# Patient Record
Sex: FEMALE | Race: WHITE | NOT HISPANIC OR LATINO | Employment: FULL TIME | URBAN - METROPOLITAN AREA
[De-identification: names, ages, dates, MRNs, and addresses within clinical notes are randomized per-mention and may not be internally consistent; named-entity substitution may affect disease eponyms.]

---

## 2017-02-27 ENCOUNTER — TELEPHONE (OUTPATIENT)
Dept: FAMILY MEDICINE | Facility: CLINIC | Age: 32
End: 2017-02-27

## 2017-02-27 ENCOUNTER — OFFICE VISIT (OUTPATIENT)
Dept: FAMILY MEDICINE | Facility: CLINIC | Age: 32
End: 2017-02-27
Payer: COMMERCIAL

## 2017-02-27 VITALS
BODY MASS INDEX: 29.25 KG/M2 | WEIGHT: 158.94 LBS | SYSTOLIC BLOOD PRESSURE: 116 MMHG | OXYGEN SATURATION: 97 % | HEIGHT: 62 IN | HEART RATE: 98 BPM | DIASTOLIC BLOOD PRESSURE: 76 MMHG

## 2017-02-27 DIAGNOSIS — J32.9 SINUSITIS, UNSPECIFIED CHRONICITY, UNSPECIFIED LOCATION: Primary | ICD-10-CM

## 2017-02-27 LAB
FLUAV AG SPEC QL IA: NEGATIVE
FLUBV AG SPEC QL IA: NEGATIVE
SPECIMEN SOURCE: NORMAL

## 2017-02-27 PROCEDURE — 87400 INFLUENZA A/B EACH AG IA: CPT | Mod: PO

## 2017-02-27 PROCEDURE — 99999 PR PBB SHADOW E&M-EST. PATIENT-LVL III: CPT | Mod: PBBFAC,,, | Performed by: NURSE PRACTITIONER

## 2017-02-27 PROCEDURE — 99213 OFFICE O/P EST LOW 20 MIN: CPT | Mod: S$GLB,,, | Performed by: NURSE PRACTITIONER

## 2017-02-27 RX ORDER — FLUTICASONE PROPIONATE 50 MCG
1 SPRAY, SUSPENSION (ML) NASAL DAILY
Qty: 16 G | Refills: 0 | Status: SHIPPED | OUTPATIENT
Start: 2017-02-27 | End: 2019-02-04 | Stop reason: SDUPTHER

## 2017-02-27 RX ORDER — PROMETHAZINE HYDROCHLORIDE AND DEXTROMETHORPHAN HYDROBROMIDE 6.25; 15 MG/5ML; MG/5ML
5 SYRUP ORAL 3 TIMES DAILY PRN
Qty: 240 ML | Refills: 0 | Status: SHIPPED | OUTPATIENT
Start: 2017-02-27 | End: 2017-03-09

## 2017-02-27 RX ORDER — AMOXICILLIN 875 MG/1
875 TABLET, FILM COATED ORAL 2 TIMES DAILY
Qty: 20 TABLET | Refills: 0 | Status: SHIPPED | OUTPATIENT
Start: 2017-02-27 | End: 2017-03-09

## 2017-02-27 RX ORDER — AZELASTINE 1 MG/ML
1 SPRAY, METERED NASAL 2 TIMES DAILY
Qty: 30 ML | Refills: 0 | Status: SHIPPED | OUTPATIENT
Start: 2017-02-27 | End: 2024-01-26

## 2017-02-27 NOTE — MR AVS SNAPSHOT
[facility]  [address]  [facility] [address]  Phone: [phone]  Fax: [phone]                  Paige Crespo   2017 3:00 PM   Office Visit    Description:  Female : 1985   Provider:  Thi Boss NP   Department:  [facility]           Reason for Visit     Sinus Problem     Cough           Diagnoses this Visit        Comments    Sinusitis, unspecified chronicity, unspecified location    -  Primary            To Do List           Goals (5 Years of Data)     None       These Medications        Disp Refills Start End    fluticasone (FLONASE) 50 mcg/actuation nasal spray 16 g 0 2017     1 spray by Each Nare route once daily. - Each Nare    Pharmacy: [facility]/pharmacy #[id number] - [place] - [address] Ph #: [phone]       azelastine (ASTELIN) 137 mcg (0.1 %) nasal spray 30 mL 0 2017    1 spray (137 mcg total) by Nasal route 2 (two) times daily. - Nasal    Pharmacy: [facility]/pharmacy #[id number] - [place] - [address] Ph #: [phone]       promethazine-dextromethorphan (PROMETHAZINE-DM) 6.25-15 mg/5 mL Syrp 240 mL 0 2017 3/9/2017    Take 5 mLs by mouth 3 (three) times daily as needed. - Oral    Pharmacy: [facility]/pharmacy #[id number] [place] - [address] Ph #: [phone]       amoxicillin (AMOXIL) 875 MG tablet 20 tablet 0 2017 3/9/2017    Take 1 tablet (875 mg total) by mouth 2 (two) times daily. - Oral    Pharmacy: [facility]/pharmacy #[id number] - [place] - [address] Ph #: [phone]         [facility]     [facility] -  Assistance  Registered nurses in the [facility] provide clinical advisement, health education, appointment booking, and other advisory services.  Call for this free service at [phone].             Medications           Message regarding Medications     Verify the changes and/or additions to your medication regime listed below are the same  as discussed with your clinician today.  If any of these changes or additions are incorrect, please notify your healthcare provider.        START taking these NEW medications        Refills    fluticasone (FLONASE) 50 mcg/actuation nasal spray 0    Si spray by Each Nare route once daily.    Class: Normal    Route: Each Nare    azelastine (ASTELIN) 137 mcg (0.1 %) nasal spray 0    Si spray (137 mcg total) by Nasal route 2 (two) times daily.    Class: Normal    Route: Nasal    promethazine-dextromethorphan (PROMETHAZINE-DM) 6.25-15 mg/5 mL Syrp 0    Sig: Take 5 mLs by mouth 3 (three) times daily as needed.    Class: Normal    Route: Oral    amoxicillin (AMOXIL) 875 MG tablet 0    Sig: Take 1 tablet (875 mg total) by mouth 2 (two) times daily.    Class: Normal    Route: Oral      STOP taking these medications     levocetirizine (XYZAL) 5 MG tablet Take 1 tablet (5 mg total) by mouth every evening.           Verify that the below list of medications is an accurate representation of the medications you are currently taking.  If none reported, the list may be blank. If incorrect, please contact your healthcare provider. Carry this list with you in case of emergency.           Current Medications     ibuprofen (ADVIL,MOTRIN) 800 MG tablet Take 800 mg by mouth 2 (two) times daily as needed.    levonorgestrel (MIRENA) 20 mcg/24 hr (5 years) IUD 1 each by Intrauterine route once.    naratriptan (AMERGE) 2.5 MG tablet Take one tablet by mouth at onset of headache, may repeat in 4 hours if needed.  Not to exceed 2 tablets in 24 hours    amoxicillin (AMOXIL) 875 MG tablet Take 1 tablet (875 mg total) by mouth 2 (two) times daily.    azelastine (ASTELIN) 137 mcg (0.1 %) nasal spray 1 spray (137 mcg total) by Nasal route 2 (two) times daily.    fluticasone (FLONASE) 50 mcg/actuation nasal spray 1 spray by Each Nare route once daily.    magnesium oxide (MAG-OX) 400 mg tablet Take 1 tablet (400 mg total) by mouth once daily.     promethazine-dextromethorphan (PROMETHAZINE-DM) 6.25-15 mg/5 mL Syrp Take 5 mLs by mouth 3 (three) times daily as needed.           Clinical Reference Information           Your Vitals Were     BP                   116/76           Blood Pressure          Most Recent Value    BP  116/76      Allergies as of 2/27/2017     No Known Allergies      Immunizations Administered on Date of Encounter - 2/27/2017     None      Orders Placed During Today's Visit      Normal Orders This Visit    Influenza antigen Nasal Swab       Language Assistance Services     ATTENTION: Language assistance services are available, free of charge. Please call [phone].      ATENCIÓN: Si habla espdomenica, tiene a kaufman disposición servicios gratuitos de asistencia lingüística. Llame al [phone].     CHÚ Ý: N?u b?n nói Ti?ng Vi?t, có các d?ch v? h? tr? ngôn ng? mi?n phí dành cho b?n. G?i s? [phone].         [facility] complies with applicable Federal civil rights laws and does not discriminate on the basis of race, color, national origin, age, disability, or sex.

## 2017-02-27 NOTE — TELEPHONE ENCOUNTER
Called in rx for Tessalon 200 mg 1 q8h prn, dispense 30, no refills. Understanding was verbalized.

## 2017-02-27 NOTE — PROGRESS NOTES
Subjective:       Patient ID: Paige rCespo is a 31 y.o. female.    Chief Complaint: Sinus Problem and Cough    HPI Comments: TETANUS VACCINE due on 05/12/2002    Past Medical History:  Past Medical History:  No date: Migraine aura without headache  No date: Migraine headache  No date: TMJ arthritis  Past Surgical History:  2008: BREAST SURGERY      Comment: augmentation  Review of patient's allergies indicates:  No Known Allergies  Current Outpatient Prescriptions on File Prior to Visit:  ibuprofen (ADVIL,MOTRIN) 800 MG tablet, Take 800 mg by mouth 2 (two) times daily as needed., Disp: , Rfl:   naratriptan (AMERGE) 2.5 MG tablet, Take one tablet by mouth at onset of headache, may repeat in 4 hours if needed.  Not to exceed 2 tablets in 24 hours, Disp: 9 tablet, Rfl: 11  magnesium oxide (MAG-OX) 400 mg tablet, Take 1 tablet (400 mg total) by mouth once daily., Disp: , Rfl: 0  (DISCONTINUED) levocetirizine (XYZAL) 5 MG tablet, Take 1 tablet (5 mg total) by mouth every evening., Disp: 30 tablet, Rfl: 3    No current facility-administered medications on file prior to visit.     Social History    Marital status: Single              Spouse name:                       Years of education:                 Number of children:               Occupational History    None on file    Social History Main Topics    Smoking status: Former Smoker                                                                Packs/day: 0.00      Years: 0.00           Quit date: 8/1/2011       Smokeless status: Not on file                       Comment: smoked ~18yo to 28 yo    Alcohol use: No              Drug use: No              Sexual activity: Yes                     Birth control/protection: IUD       Comment: Mirena    Other Topics            Concern    None on file    Social History Narrative    , previously worked for [employer], left in 2013 and 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       Review of patient's family history indicates:    Hypertension                   Father                    Cancer                         Maternal Grandfather        Comment: bone ca    Hypertension                   Paternal Grandmother      Heart disease                  Paternal Grandfather        Comment: MI          Sinus Problem   This is a new (x 1 week) problem. The problem has been gradually worsening (worsneing over the weekend, fever started yesterday) since onset. The maximum temperature recorded prior to her arrival was 101 - 101.9 F. The fever has been present for less than 1 day. Associated symptoms include congestion, coughing, headaches, a hoarse voice, sinus pressure, sneezing and a sore throat. Pertinent negatives include no chills, diaphoresis, ear pain, neck pain, shortness of breath or swollen glands. Past treatments include oral decongestants and acetaminophen. The treatment provided no relief.     Review of Systems   Constitutional: Positive for fatigue and fever. Negative for chills and diaphoresis.   HENT: Positive for congestion, hoarse voice, postnasal drip, rhinorrhea, sinus pressure, sneezing and sore throat. Negative for ear pain.    Respiratory: Positive for cough. Negative for chest tightness and shortness of breath.    Cardiovascular: Negative.    Gastrointestinal: Negative.  Negative for diarrhea, nausea and vomiting.   Musculoskeletal: Negative for neck pain.   Neurological: Positive for headaches. Negative for dizziness and light-headedness.       Objective:      Physical Exam   Constitutional: She is oriented to person, place, and time. No distress.   HENT:   Head: Normocephalic and atraumatic. Head is without raccoon's eyes.   Right Ear: No middle ear effusion.   Left Ear:  No middle ear effusion.   Nose: Mucosal edema and rhinorrhea present. Right sinus exhibits maxillary sinus tenderness and frontal sinus tenderness. Left sinus exhibits maxillary sinus  tenderness and frontal sinus tenderness.   Mouth/Throat: Uvula is midline. Posterior oropharyngeal erythema present.   Eyes: Pupils are equal, round, and reactive to light.   Cardiovascular: Normal rate and regular rhythm.  Exam reveals no friction rub.    No murmur heard.  Pulmonary/Chest: Effort normal and breath sounds normal.   Abdominal: Soft. Bowel sounds are normal. She exhibits no distension. There is no tenderness.   Neurological: She is alert and oriented to person, place, and time.   Skin: She is not diaphoretic. No erythema.   Psychiatric: She has a normal mood and affect. Her behavior is normal.   Vitals reviewed.      Assessment:       1. Sinusitis, unspecified chronicity, unspecified location        Plan:       1. Sinusitis, unspecified chronicity, unspecified location  Follow up if not resolving.  - Influenza antigen Nasal Swab  - fluticasone (FLONASE) 50 mcg/actuation nasal spray; 1 spray by Each Nare route once daily.  Dispense: 16 g; Refill: 0  - azelastine (ASTELIN) 137 mcg (0.1 %) nasal spray; 1 spray (137 mcg total) by Nasal route 2 (two) times daily.  Dispense: 30 mL; Refill: 0  - promethazine-dextromethorphan (PROMETHAZINE-DM) 6.25-15 mg/5 mL Syrp; Take 5 mLs by mouth 3 (three) times daily as needed.  Dispense: 240 mL; Refill: 0  - amoxicillin (AMOXIL) 875 MG tablet; Take 1 tablet (875 mg total) by mouth 2 (two) times daily.  Dispense: 20 tablet; Refill: 0

## 2017-02-27 NOTE — TELEPHONE ENCOUNTER
----- Message from Ivonne Abdul sent at 2/27/2017  3:49 PM CST -----  Call roc at [facility] at [phone]//calling about he medication Promethazine dm its no longer in stock it been back order want to know do you want to get something different//thks ht

## 2017-09-14 ENCOUNTER — OFFICE VISIT (OUTPATIENT)
Dept: INTERNAL MEDICINE | Facility: CLINIC | Age: 32
End: 2017-09-14
Payer: COMMERCIAL

## 2017-09-14 VITALS
HEIGHT: 62 IN | DIASTOLIC BLOOD PRESSURE: 57 MMHG | WEIGHT: 154.13 LBS | TEMPERATURE: 98 F | HEART RATE: 67 BPM | BODY MASS INDEX: 28.36 KG/M2 | SYSTOLIC BLOOD PRESSURE: 99 MMHG

## 2017-09-14 DIAGNOSIS — J30.9 ALLERGIC SINUSITIS: Primary | ICD-10-CM

## 2017-09-14 PROCEDURE — 3008F BODY MASS INDEX DOCD: CPT | Mod: S$GLB,,, | Performed by: INTERNAL MEDICINE

## 2017-09-14 PROCEDURE — 99212 OFFICE O/P EST SF 10 MIN: CPT | Mod: S$GLB,,, | Performed by: INTERNAL MEDICINE

## 2017-09-14 PROCEDURE — 99999 PR PBB SHADOW E&M-EST. PATIENT-LVL III: CPT | Mod: PBBFAC,,, | Performed by: INTERNAL MEDICINE

## 2017-09-14 NOTE — PROGRESS NOTES
"Clinic Note  9/14/2017      Subjective:       Patient ID:  Paige is a 32 y.o. female being seen for an urgent care visit.    Chief Complaint: Cough and Nasal Congestion    Sinus Problem   This is a recurrent problem. Episode onset: 5-6 weeks. The problem is unchanged. There has been no fever. She is experiencing no pain. Associated symptoms include coughing, ear pain, sinus pressure and sneezing. Past treatments include nothing.       Review of Systems   HENT: Positive for ear pain, sinus pressure and sneezing.    Respiratory: Positive for cough.        Medication List with Changes/Refills   Current Medications    AZELASTINE (ASTELIN) 137 MCG (0.1 %) NASAL SPRAY    1 spray (137 mcg total) by Nasal route 2 (two) times daily.    FLUTICASONE (FLONASE) 50 MCG/ACTUATION NASAL SPRAY    1 spray by Each Nare route once daily.    IBUPROFEN (ADVIL,MOTRIN) 800 MG TABLET    Take 800 mg by mouth 2 (two) times daily as needed.    LEVONORGESTREL (MIRENA) 20 MCG/24 HR (5 YEARS) IUD    1 each by Intrauterine route once.    MAGNESIUM OXIDE (MAG-OX) 400 MG TABLET    Take 1 tablet (400 mg total) by mouth once daily.    NARATRIPTAN (AMERGE) 2.5 MG TABLET    Take one tablet by mouth at onset of headache, may repeat in 4 hours if needed.  Not to exceed 2 tablets in 24 hours       Patient Active Problem List   Diagnosis    Migraine headache           Objective:      BP (!) 99/57   Pulse 67   Temp 97.9 °F (36.6 °C)   Ht 5' 2" (1.575 m)   Wt 69.9 kg (154 lb 1.6 oz)   BMI 28.19 kg/m²   Estimated body mass index is 28.19 kg/m² as calculated from the following:    Height as of this encounter: 5' 2" (1.575 m).    Weight as of this encounter: 69.9 kg (154 lb 1.6 oz).  Physical Exam   Constitutional: She is well-developed, well-nourished, and in no distress.   HENT:   Left Ear: Tympanic membrane normal.   Ears:    Nose: Mucosal edema and rhinorrhea present. Right sinus exhibits no maxillary sinus tenderness and no frontal sinus tenderness. " Left sinus exhibits no maxillary sinus tenderness and no frontal sinus tenderness.   Mouth/Throat: No oropharyngeal exudate, posterior oropharyngeal edema, posterior oropharyngeal erythema or tonsillar abscesses.   Cardiovascular: Normal rate.    Pulmonary/Chest: Effort normal.         Assessment and Plan:         Problem List Items Addressed This Visit     None      Visit Diagnoses     Allergic sinusitis    -  Primary - flonase and otc h1 blocker (allegra/claritin/zyrtec etc) recommended.  Does not need abx at this time.           Follow Up:   Return if symptoms worsen or fail to improve.        Jung Baca

## 2017-09-22 ENCOUNTER — OFFICE VISIT (OUTPATIENT)
Dept: URGENT CARE | Facility: CLINIC | Age: 32
End: 2017-09-22
Payer: COMMERCIAL

## 2017-09-22 VITALS
SYSTOLIC BLOOD PRESSURE: 123 MMHG | BODY MASS INDEX: 27.6 KG/M2 | HEART RATE: 93 BPM | WEIGHT: 150 LBS | TEMPERATURE: 98 F | DIASTOLIC BLOOD PRESSURE: 67 MMHG | RESPIRATION RATE: 18 BRPM | HEIGHT: 62 IN

## 2017-09-22 DIAGNOSIS — J32.9 SINUSITIS, UNSPECIFIED CHRONICITY, UNSPECIFIED LOCATION: Primary | ICD-10-CM

## 2017-09-22 DIAGNOSIS — J30.9 ACUTE ALLERGIC RHINITIS, UNSPECIFIED SEASONALITY, UNSPECIFIED TRIGGER: ICD-10-CM

## 2017-09-22 DIAGNOSIS — H61.21 IMPACTED CERUMEN OF RIGHT EAR: ICD-10-CM

## 2017-09-22 PROCEDURE — 69209 REMOVE IMPACTED EAR WAX UNI: CPT | Mod: RT,S$GLB,, | Performed by: PHYSICIAN ASSISTANT

## 2017-09-22 PROCEDURE — 96372 THER/PROPH/DIAG INJ SC/IM: CPT | Mod: S$GLB,,, | Performed by: PHYSICIAN ASSISTANT

## 2017-09-22 PROCEDURE — 3008F BODY MASS INDEX DOCD: CPT | Mod: S$GLB,,, | Performed by: PHYSICIAN ASSISTANT

## 2017-09-22 PROCEDURE — 99203 OFFICE O/P NEW LOW 30 MIN: CPT | Mod: 25,S$GLB,, | Performed by: PHYSICIAN ASSISTANT

## 2017-09-22 RX ORDER — AMOXICILLIN AND CLAVULANATE POTASSIUM 875; 125 MG/1; MG/1
1 TABLET, FILM COATED ORAL 2 TIMES DAILY
Qty: 20 TABLET | Refills: 0 | Status: SHIPPED | OUTPATIENT
Start: 2017-09-22 | End: 2017-10-02

## 2017-09-22 RX ORDER — BETAMETHASONE SODIUM PHOSPHATE AND BETAMETHASONE ACETATE 3; 3 MG/ML; MG/ML
9 INJECTION, SUSPENSION INTRA-ARTICULAR; INTRALESIONAL; INTRAMUSCULAR; SOFT TISSUE ONCE
Status: COMPLETED | OUTPATIENT
Start: 2017-09-22 | End: 2017-09-22

## 2017-09-22 RX ADMIN — BETAMETHASONE SODIUM PHOSPHATE AND BETAMETHASONE ACETATE 9 MG: 3; 3 INJECTION, SUSPENSION INTRA-ARTICULAR; INTRALESIONAL; INTRAMUSCULAR; SOFT TISSUE at 10:09

## 2017-09-22 NOTE — PATIENT INSTRUCTIONS
- Please return here or go to the Emergency Department for any concerns or worsening of condition.   - If you were prescribed antibiotics, please take them to completion.  - Please follow up with your primary care provider (PCP) or discussed specialist(s) as needed.         Sinusitis (Antibiotic Treatment)    The sinuses are air-filled spaces within the bones of the face. They connect to the inside of the nose. Sinusitis is an inflammation of the tissue lining the sinus cavity. Sinus inflammation can occur during a cold. It can also be due to allergies to pollens and other particles in the air. Sinusitis can cause symptoms of sinus congestion and fullness. A sinus infection causes fever, headache and facial pain. There is often green or yellow drainage from the nose or into the back of the throat (post-nasal drip). You have been given antibiotics to treat this condition.  Home care:  · Take the full course of antibiotics as instructed. Do not stop taking them, even if you feel better.  · Drink plenty of water, hot tea, and other liquids. This may help thin mucus. It also may promote sinus drainage.  · Heat may help soothe painful areas of the face. Use a towel soaked in hot water. Or,  the shower and direct the hot spray onto your face. Using a vaporizer along with a menthol rub at night may also help.   · An expectorant containing guaifenesin may help thin the mucus and promote drainage from the sinuses.  · Over-the-counter decongestants may be used unless a similar medicine was prescribed. Nasal sprays work the fastest. Use one that contains phenylephrine or oxymetazoline. First blow the nose gently. Then use the spray. Do not use these medicines more often than directed on the label or symptoms may get worse. You may also use tablets containing pseudoephedrine. Avoid products that combine ingredients, because side effects may be increased. Read labels. You can also ask the pharmacist for help.  (NOTE: Persons with high blood pressure should not use decongestants. They can raise blood pressure.)  · Over-the-counter antihistamines may help if allergies contributed to your sinusitis.    · Do not use nasal rinses or irrigation during an acute sinus infection, unless told to by your health care provider. Rinsing may spread the infection to other sinuses.  · Use acetaminophen or ibuprofen to control pain, unless another pain medicine was prescribed. (If you have chronic liver or kidney disease or ever had a stomach ulcer, talk with your doctor before using these medicines. Aspirin should never be used in anyone under 18 years of age who is ill with a fever. It may cause severe liver damage.)  · Don't smoke. This can worsen symptoms.  Follow-up care  Follow up with your healthcare provider or our staff if you are not improving within the next week.  When to seek medical advice  Call your healthcare provider if any of these occur:  · Facial pain or headache becoming more severe  · Stiff neck  · Unusual drowsiness or confusion  · Swelling of the forehead or eyelids  · Vision problems, including blurred or double vision  · Fever of 100.4ºF (38ºC) or higher, or as directed by your healthcare provider  · Seizure  · Breathing problems  · Symptoms not resolving within 10 days  Date Last Reviewed: 4/13/2015  © 7361-9778 [employer]. [address] [place] [address], [place] [address]. All rights reserved. This information is not intended as a substitute for professional medical care. Always follow your healthcare professional's instructions.      Earwax Removal    The ear canal makes earwax from the canals lining. The ears make wax to lubricate and protect the ear canal. The ear canal is the tube that connects the middle ear to the outside of the ear. The wax protects the ear from bacteria, infection, and damage from water or trauma.  The wax that forms in the canal naturally moves toward the outside of the ear and  falls out. In some cases, the ear may make too much wax. If the wax causes problems or keeps the healthcare provider from seeing into the ear, the extra wax may be removed.  Too much wax can affect your hearing. It can cause itching. In rare cases, it can be painful. Earwax should not be removed unless it is causing a problem. You should not stick objects into your ear to remove wax unless told to do so by your healthcare provider.  Healthcare providers can remove earwax safely. It is important to stay still during the procedure to avoid damage to the ear canal. But removing earwax generally doesnt hurt. You will not usually need anesthesia or pain medicine when the provider removes the earwax.  A number of conditions lead to earwax buildup. These include some skin problems, a narrow ear canal, or ears that make too much earwax. Using cotton swabs in the canal pushes earwax deeper into the ear and contributes to the buildup of earwax.  Home care  · The healthcare provider may recommend mineral oil or an over-the-counter eardrop to use at home to soften the earwax. Use these products only if the provider recommends them. Use these products only if the provider recommends them. Carefully follow the instructions given.  · Dont use mineral oil or OTC eardrops if you might have an ear infection or a ruptured eardrum. Tell your healthcare provider right away if you have diabetes or an immune disorder.  · Dont use cotton swabs in your ears. Cotton swabs may push wax deeper into the ear canal or damage the eardrum. Use cotton gauze or a wet washcloth  to gently remove wax on the outside of the ear and around the opening to the ear canal.  · Don't use any probing device or object such as cotton-tipped swabs or lakeisha pins to clean the inside of your ears.  · Dont use ear candles to clean your ears. Candling can be dangerous. It can burn the ear canal. It can also make the condition worse instead of better.  · Dont use  cold water to rinse the ear. This will make you dizzy. If your provider tells you to rinse your ear, use only warm water or follow his or her instructions.  · Check the ear for signs of infection or irritation listed below under When to seek medical advice.  Steps for using eardrops  1. Warm the medicine bottle by rubbing it between your hands for a few minutes.  2. Lie down on your side, with the affected ear up.  3. Place the recommended number of drops in the ear. Wet a cotton ball with the medicine. Gently put the cotton ball into the ear opening.  Follow-up care  Follow up with your healthcare provider, or as directed.  When to seek medical advice  Call the provider right away if you have:  · Ear pain that gets worse  · Fever of 100.4F°F (38°C) or higher, or as directed by your healthcare provider  · Worsening wax buildup  · Severe pain, dizziness, or nausea  · Bleeding from the ear  · Hearing problems  · Signs of irritation from the eardrops, such as burning, stinging, or swelling and tenderness  · Foul-smelling fluid draining from the ear  · Swelling, redness, or tenderness of the outer ear  · Headache, neck pain, or stiff neck  Date Last Reviewed: 3/22/2015  © 2592-0612 [employer]. [address] [place] [address], [place] [address]. All rights reserved. This information is not intended as a substitute for professional medical care. Always follow your healthcare professional's instructions.      Allergic Rhinitis  Allergic rhinitis is an allergic reaction that affects the nose, and often the eyes. Its often known as nasal allergies. Nasal allergies are often due to things in the environment that are breathed in. Depending what you are sensitive to, nasal allergies may occur only during certain seasons. Or they may occur year round. Common indoor allergens include house dust mites, mold, cockroaches, and pet dander. Outdoor allergens include pollen from trees, grasses, and weeds.   Symptoms include a  drippy, stuffy, and itchy nose. They also include sneezing and red and itchy eyes. You may feel tired more often. Severe allergies may also affect your breathing and trigger a condition called asthma.   Tests can be done to see what allergens are affecting you. You may be referred to an allergy specialist for testing and further evaluation.  Home care  Your healthcare provider may prescribe medicines to help relieve allergy symptoms. These may include oral medicines, nasal sprays, or eye drops.  Ask your provider for advice on how to avoid substances that you are allergic to. Below are a few tips for each type of allergen.  Pet dander:  · Do not have pets with fur and feathers.  · If you can't avoid having a pet, keep it out of your bedroom and off upholstered furniture.  Pollen:  · When pollen counts are high, keep windows of your car and home closed. If possible, use an air conditioner instead.  · Wear a filter mask when mowing or doing yard work.  House dust mites:  · Wash bedding every week in warm water and detergent and dry on a hot setting.  · Cover the mattress, box spring, and pillows with allergy covers.   · If possible, sleep in a room with no carpet, curtains, or upholstered furniture.  Cockroaches:  · Store food in sealed containers.  · Remove garbage from the home promptly.  · Fix water leaks  Mold:  · Keep humidity low by using a dehumidifier or air conditioner. Keep the dehumidifier and air conditioner clean and free of mold.  · Clean moldy areas with bleach and water.  In general:  · Vacuum once or twice a week. If possible, use a vacuum with a high-efficiency particulate air (HEPA) filter.  · Do not smoke. Avoid cigarette smoke. Cigarette smoke is an irritant that can make symptoms worse.  Follow-up care  Follow up as advised by the healthcare provider or our staff. If you were referred to an allergy specialist, make this appointment promptly.  When to seek medical advice  Call your healthcare  provider right away if the following occur:  · Coughing or wheezing  · Fever greater than 100.4°F (38°C)  · Hives (raised red bumps)  · Continuing symptoms, new symptoms, or worsening symptoms  Call 911 right away if you have:  · Trouble breathing  · Severe swelling of the face or severe itching of the eyes or mouth  Date Last Reviewed: 3/1/2017  © 1672-1452 [employer]. [address] [place] [address], [place] [address]. All rights reserved. This information is not intended as a substitute for professional medical care. Always follow your healthcare professional's instructions.

## 2017-09-22 NOTE — PROGRESS NOTES
"Subjective:       Patient ID: Paige Crespo is a 32 y.o. female.    Vitals:  height is 5' 2" (1.575 m) and weight is 68 kg (150 lb). Her oral temperature is 97.6 °F (36.4 °C). Her blood pressure is 123/67 and her pulse is 93. Her respiration is 18.     Chief Complaint: Sinus Problem    This is a 32 y.o. female with Past Medical History:  No date: Migraine aura without headache  No date: Migraine headache  No date: TMJ arthritis   who presents today with a chief complaint of sinus problem.      Sinus Problem   This is a new problem. The current episode started more than 1 month ago (6 weeks). The problem has been gradually worsening since onset. There has been no fever. Her pain is at a severity of 0/10. She is experiencing no pain. Associated symptoms include congestion, coughing, ear pain (right ear), headaches, a hoarse voice, sinus pressure and a sore throat. Pertinent negatives include no chills, neck pain, shortness of breath or swollen glands. Past treatments include acetaminophen, sitting up and saline sprays (flonase and oral antihistamines). The treatment provided no relief.     Review of Systems   Constitution: Positive for malaise/fatigue. Negative for chills and fever.   HENT: Positive for congestion, ear pain (right ear), hoarse voice, sinus pressure and sore throat. Negative for ear discharge.    Eyes: Negative for blurred vision, discharge, pain, redness and visual disturbance.   Cardiovascular: Negative for chest pain, dyspnea on exertion, leg swelling, near-syncope and syncope.   Respiratory: Positive for cough and sputum production. Negative for shortness of breath and wheezing.    Hematologic/Lymphatic: Negative for adenopathy.   Skin: Negative for itching and rash.   Musculoskeletal: Negative for back pain, myalgias, neck pain and stiffness.   Gastrointestinal: Negative for abdominal pain, diarrhea, nausea and vomiting.   Neurological: Positive for headaches. Negative for dizziness, " light-headedness and numbness.   Psychiatric/Behavioral: Negative for altered mental status.   Allergic/Immunologic: Negative for hives.   All other systems reviewed and are negative.      Objective:      Physical Exam   Constitutional: She is oriented to person, place, and time. She appears well-developed and well-nourished.  Non-toxic appearance. She does not have a sickly appearance. She does not appear ill. No distress.   HENT:   Head: Normocephalic and atraumatic.   Right Ear: Hearing and external ear normal. No drainage, swelling or tenderness. A foreign body (cerumen impaction ) is present. Tympanic membrane is bulging. Tympanic membrane is not injected and not erythematous. No hemotympanum. No decreased hearing is noted.   Left Ear: Hearing, external ear and ear canal normal. No drainage, swelling or tenderness. No foreign bodies. Tympanic membrane is bulging. Tympanic membrane is not injected and not erythematous. No hemotympanum. No decreased hearing is noted.   Nose: Mucosal edema present. No epistaxis. Right sinus exhibits maxillary sinus tenderness and frontal sinus tenderness. Left sinus exhibits maxillary sinus tenderness and frontal sinus tenderness.   Mouth/Throat: Uvula is midline and mucous membranes are normal. No uvula swelling. Posterior oropharyngeal erythema present.   Eyes: Pupils are equal, round, and reactive to light.   Neck: Normal range of motion. Neck supple.   Cardiovascular: Normal rate, regular rhythm and normal heart sounds.  Exam reveals no gallop and no friction rub.    No murmur heard.  Pulmonary/Chest: Effort normal. No respiratory distress. She has no decreased breath sounds. She has no wheezes. She has rhonchi (very mild. clears easily with coughing. upper airway congestion noted.) in the right middle field and the left middle field. She has no rales.   Musculoskeletal: Normal range of motion.   Lymphadenopathy:        Head (right side): No submental, no submandibular, no  "tonsillar, no preauricular, no posterior auricular and no occipital adenopathy present.        Head (left side): No submental, no submandibular, no tonsillar, no preauricular, no posterior auricular and no occipital adenopathy present.     She has no cervical adenopathy.        Right cervical: No posterior cervical adenopathy present.       Left cervical: No posterior cervical adenopathy present.        Right: No supraclavicular adenopathy present.        Left: No supraclavicular adenopathy present.   Neurological: She is alert and oriented to person, place, and time. She is not disoriented. Coordination and gait normal.   Skin: No abrasion, no ecchymosis, no laceration and no rash noted. No erythema.   Psychiatric: She has a normal mood and affect. Her behavior is normal.   Nursing note and vitals reviewed.      /67 (BP Location: Left arm, Patient Position: Sitting, BP Method: Medium (Automatic))   Pulse 93   Temp 97.6 °F (36.4 °C) (Oral)   Resp 18   Ht 5' 2" (1.575 m)   Wt 68 kg (150 lb)   PF 98 L/min   BMI 27.44 kg/m²      Auditory canal(s) of the right ear are completely obstructed with cerumen.   Cerumen was removed using gentle irrigation. Tympanic membranes are intact following the procedure.  Auditory canals are normal.     Assessment:       1. Sinusitis, unspecified chronicity, unspecified location    2. Impacted cerumen of right ear    3. Acute allergic rhinitis, unspecified seasonality, unspecified trigger        Plan:         Sinusitis, unspecified chronicity, unspecified location  -     betamethasone acetate-betamethasone sodium phosphate injection 9 mg; Inject 1.5 mLs (9 mg total) into the muscle once.  -     amoxicillin-clavulanate 875-125mg (AUGMENTIN) 875-125 mg per tablet; Take 1 tablet by mouth 2 (two) times daily.  Dispense: 20 tablet; Refill: 0    Impacted cerumen of right ear    Acute allergic rhinitis, unspecified seasonality, unspecified trigger  -     betamethasone " acetate-betamethasone sodium phosphate injection 9 mg; Inject 1.5 mLs (9 mg total) into the muscle once.    - Please return here or go to the Emergency Department for any concerns or worsening of condition.   - If you were prescribed antibiotics, please take them to completion.  - Please follow up with your primary care provider (PCP) or discussed specialist(s) as needed.

## 2018-01-22 ENCOUNTER — PATIENT MESSAGE (OUTPATIENT)
Dept: NEUROLOGY | Facility: CLINIC | Age: 33
End: 2018-01-22

## 2018-01-22 DIAGNOSIS — G43.009 MIGRAINE WITHOUT AURA AND WITHOUT STATUS MIGRAINOSUS, NOT INTRACTABLE: Primary | ICD-10-CM

## 2018-01-22 RX ORDER — KETOROLAC TROMETHAMINE 10 MG/1
10 TABLET, FILM COATED ORAL 3 TIMES DAILY PRN
Qty: 30 TABLET | Refills: 1 | Status: SHIPPED | OUTPATIENT
Start: 2018-01-22 | End: 2018-03-23

## 2018-05-29 ENCOUNTER — TELEPHONE (OUTPATIENT)
Dept: OBSTETRICS AND GYNECOLOGY | Facility: CLINIC | Age: 33
End: 2018-05-29

## 2018-05-29 NOTE — TELEPHONE ENCOUNTER
----- Message from Shola Ordaz sent at 5/29/2018  4:13 PM CDT -----  Contact: Pt  Name of Who is Calling: Paige      What is the request in detail: Pt is calling states she would like to have birth control taken out when she comes to appointment on Thursday.      Can the clinic reply by [facility]: yes      What Number to Call Back if not in [facility]: [phone] or [phone] (home)

## 2018-05-31 ENCOUNTER — OFFICE VISIT (OUTPATIENT)
Dept: OBSTETRICS AND GYNECOLOGY | Facility: CLINIC | Age: 33
End: 2018-05-31
Payer: COMMERCIAL

## 2018-05-31 VITALS
SYSTOLIC BLOOD PRESSURE: 116 MMHG | WEIGHT: 155.44 LBS | DIASTOLIC BLOOD PRESSURE: 64 MMHG | BODY MASS INDEX: 28.43 KG/M2

## 2018-05-31 DIAGNOSIS — Z12.4 ENCOUNTER FOR PAP SMEAR OF CERVIX WITH HPV DNA COTESTING: Primary | ICD-10-CM

## 2018-05-31 DIAGNOSIS — Z30.430 ENCOUNTER FOR INSERTION OF INTRAUTERINE CONTRACEPTIVE DEVICE (IUD): ICD-10-CM

## 2018-05-31 PROCEDURE — 88175 CYTOPATH C/V AUTO FLUID REDO: CPT

## 2018-05-31 PROCEDURE — 99385 PREV VISIT NEW AGE 18-39: CPT | Mod: S$GLB,,, | Performed by: OBSTETRICS & GYNECOLOGY

## 2018-05-31 PROCEDURE — 87624 HPV HI-RISK TYP POOLED RSLT: CPT

## 2018-05-31 PROCEDURE — 99999 PR PBB SHADOW E&M-EST. PATIENT-LVL III: CPT | Mod: PBBFAC,,, | Performed by: OBSTETRICS & GYNECOLOGY

## 2018-05-31 RX ORDER — KETOROLAC TROMETHAMINE 10 MG/1
10 TABLET, FILM COATED ORAL EVERY 6 HOURS
COMMUNITY
End: 2019-01-23 | Stop reason: SDUPTHER

## 2018-05-31 NOTE — PROGRESS NOTES
Chief Complaint   Patient presents with    Well Woman    Contraception     Remove IUD and discuss alternatives       History and Physical:  No LMP recorded. Patient has had an implant.       Paige Crespo is a 33 y.o.  female who presents today for her routine annual GYN exam. The patient has no Gynecology complaints today. Due for IUD out now.       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 16 g 0    levonorgestrel (MIRENA) 20 mcg/24 hr (5 years) IUD 1 each by Intrauterine route once.      azelastine (ASTELIN) 137 mcg (0.1 %) nasal spray 1 spray (137 mcg total) by Nasal route 2 (two) times daily. 30 mL 0    [DISCONTINUED] magnesium oxide (MAG-OX) 400 mg tablet Take 1 tablet (400 mg total) by mouth once daily.  0    [DISCONTINUED] naratriptan (AMERGE) 2.5 MG tablet Take one tablet by mouth at onset of headache, may repeat in 4 hours if needed.  Not to exceed 2 tablets in 24 hours 9 tablet 11     No current facility-administered medications on file prior to visit.        OB History      Para Term  AB Living    2 2       2    SAB TAB Ectopic Multiple Live Births                       Social History     Social History    Marital status: Single     Spouse name: N/A    Number of children: N/A    Years of education: N/A     Occupational History    Not on file.     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        Family History   Problem Relation Age of Onset    Hypertension Father     Cancer Maternal Grandfather         bone ca    Hypertension Paternal Grandmother     Heart disease Paternal Grandfather         MI         Past medical and surgical history reviewed.   I have reviewed the patient's medical history in detail and updated the computerized patient record.        Review of System:  General: no chills, fever, night sweats, weight gain or weight loss  Psychological: no depression or suicidal ideation  Breasts: no new or changing breast lumps, nipple discharge or masses.  Respiratory: no cough, shortness of breath, or wheezing  Cardiovascular: no chest pain or dyspnea on exertion  Gastrointestinal: no abdominal pain, change in bowel habits, or black or bloody stools  Genito-Urinary: no incontinence, urinary frequency/urgency or vulvar/vaginal symptoms, pelvic pain or abnormal vaginal bleeding.  Musculoskeletal: no gait disturbance or muscular weakness      Physical Exam:   /64   Wt 70.5 kg (155 lb 6.8 oz)   BMI 28.43 kg/m²   Constitutional: She is oriented to person, place, and time. She appears well-developed and well-nourished. No distress.   HENT:   Head: Normocephalic and atraumatic.   Eyes: Conjunctivae and EOM are normal. No scleral icterus.   Neck: Normal range of motion. Neck supple. No tracheal deviation present.   Cardiovascular: Normal rate.    Pulmonary/Chest: Effort normal. No respiratory distress. She exhibits no tenderness.  Breasts: are symmetrical. Implants bilaterally.    Right breast exhibits no inverted nipple, no mass, no nipple discharge, no skin change and no tenderness.    Left breast exhibits no inverted nipple, no mass, no nipple discharge, no skin change and no tenderness.  Abdominal: Soft. She exhibits no distension and no mass. There is no tenderness. There is no rebound and no guarding.   Genitourinary:    External rectal exam shows no thrombosed external hemorrhoids.    Pelvic exam was performed with patient supine.   No labial fusion.   There is no rash, lesion or injury on the right labia.   There is no rash, lesion or injury on the left labia.   No bleeding and no signs of injury around the vaginal introitus, urethra is without lesions and well supported. The cervix is visualized with no discharge, lesions or friability.   No vaginal discharge found.    No significant Cystocele, Enterocele or rectocele, and uterus well supported.   Bimanual exam:   The urethra is normal to palpation and there are no palpable vaginal wall masses.   Uterus is not deviated, not enlarged, not fixed, normal shape and not tender.   Cervix exhibits no motion tenderness.    Right adnexum displays no mass and no tenderness.   Left adnexum displays no mass and no tenderness.  Musculoskeletal: Normal range of motion.   Lymphadenopathy: No inguinal adenopathy present.   Neurological: She is alert and oriented to person, place, and time. Coordination normal.   Skin: Skin is warm and dry. She is not diaphoretic.   Psychiatric: She has a normal mood and affect.      Assessment:   Normal annual GYN exam  1. Encounter for Pap smear of cervix with HPV DNA cotesting  Liquid-based pap smear, screening    HPV High Risk Genotypes, PCR       Plan:   PAP  Mammogram at 39  Schedule IUD removal and replacement.   Follow up in 2 weeks for iud placeent.  Patient informed will be contacted with results within 2 weeks. Encouraged to please call back or email if she has not heard from us by then.

## 2018-06-07 LAB
HPV16 AG SPEC QL: NEGATIVE
HPV16+18+H RISK 12 DNA CVX-IMP: NEGATIVE
HPV18 DNA SPEC QL NAA+PROBE: NEGATIVE

## 2018-07-11 ENCOUNTER — OFFICE VISIT (OUTPATIENT)
Dept: OBSTETRICS AND GYNECOLOGY | Facility: CLINIC | Age: 33
End: 2018-07-11
Payer: COMMERCIAL

## 2018-07-11 VITALS
WEIGHT: 153.88 LBS | SYSTOLIC BLOOD PRESSURE: 112 MMHG | DIASTOLIC BLOOD PRESSURE: 72 MMHG | BODY MASS INDEX: 28.15 KG/M2

## 2018-07-11 DIAGNOSIS — Z30.432 ENCOUNTER FOR IUD REMOVAL: ICD-10-CM

## 2018-07-11 DIAGNOSIS — Z30.430 ENCOUNTER FOR INSERTION OF INTRAUTERINE CONTRACEPTIVE DEVICE (IUD): Primary | ICD-10-CM

## 2018-07-11 DIAGNOSIS — Z97.5 IUD CONTRACEPTION: ICD-10-CM

## 2018-07-11 LAB
B-HCG UR QL: NEGATIVE
CTP QC/QA: YES

## 2018-07-11 PROCEDURE — 88300 SURGICAL PATH GROSS: CPT | Mod: 26,,, | Performed by: PATHOLOGY

## 2018-07-11 PROCEDURE — 81025 URINE PREGNANCY TEST: CPT | Mod: S$GLB,,, | Performed by: OBSTETRICS & GYNECOLOGY

## 2018-07-11 PROCEDURE — 58301 REMOVE INTRAUTERINE DEVICE: CPT | Mod: S$GLB,,, | Performed by: OBSTETRICS & GYNECOLOGY

## 2018-07-11 PROCEDURE — 99999 PR PBB SHADOW E&M-EST. PATIENT-LVL III: CPT | Mod: PBBFAC,,, | Performed by: OBSTETRICS & GYNECOLOGY

## 2018-07-11 PROCEDURE — 58300 INSERT INTRAUTERINE DEVICE: CPT | Mod: S$GLB,,, | Performed by: OBSTETRICS & GYNECOLOGY

## 2018-07-11 PROCEDURE — 3008F BODY MASS INDEX DOCD: CPT | Mod: CPTII,S$GLB,, | Performed by: OBSTETRICS & GYNECOLOGY

## 2018-07-11 PROCEDURE — 88300 SURGICAL PATH GROSS: CPT | Performed by: PATHOLOGY

## 2018-07-11 NOTE — PROGRESS NOTES
History of Present Illness:   Pateint presents today 1 month status post Intrauterine Device  Insertion without complaint. .    Pathology: none    Physical exam:  /72   Wt 69.8 kg (153 lb 14.1 oz)   BMI 28.15 kg/m²   Constitutional: She is oriented to person, place, and time. She appears well-developed and well-nourished. No distress.   HENT:   Head: Normocephalic and atraumatic.   Eyes: Conjunctivae and EOM are normal. No scleral icterus.   Neck: Normal range of motion. Neck supple. No tracheal deviation present.   Cardiovascular: Normal rate.    Pulmonary/Chest: Effort normal. No respiratory distress. She exhibits no tenderness.  Breasts: deferred  Abdominal: Soft. She exhibits no distension and no mass.  The incision is healing well. There is no rebound and no guarding.   Genitourinary:     External rectal exam shows no thrombosed external hemorrhoids.    Pelvic exam was performed with patient supine.   There is no rash, lesion or injury on the right labia.   There is no rash, lesion or injury on the left labia.   No bleeding and no signs of injury around the vaginal introitus, urethra is without lesions and well supported. The cervix is visualized with no discharge, lesions or friability. Intrauterine Device string easily visualized. Removed without difficulty.    No vaginal discharge found.    No significant Cystocele, Enterocele or rectocele, and uterus well supported.    Intrauterine device Placement:  7/11/2018  PRE-IUD PLACEMENT COUNSELING:  All contraceptive options were reviewed and the patient chooses an IUD.  The patient's history was reviewed and there are no contraindications to an IUD. The procedure and minimal risks of pain, bleeding, perforation and infection at the insertion and spontaneous expulsion within the first two weeks was discussed. The benefits of amenorrhea and no systemic side effects were explained. All questions were answered and the patient agrees to proceed. Consent was  signed (scanned into computer).    EXAM:  Uterine Position: antiverted    PROCEDURE:  TIME OUT PERFORMED.  The cervix visualized with a speculum.  A single tooth tenaculum was not placed on the anterior lip.  The uterus sounded to 9cm using sterile technique.  A Mirena IUD (lot#[id number])  was loaded and placed high in uterine fundus without difficulty using sterile technique.  The string was cut to 2cm length from exo cervix.   All instruments were removed from the cervix and vagina and the procedure was tolerated well.     ASSESSMENT:  1. Contraception management / IUD insertion.V25.0.    POST IUD PLACEMENT COUNSELING:  Manage post IUD placement pain with NSAIDs, Tylenol or Rx per Med[employer].  IUD danger signs and how to check the strings.  Removal in 5 years for Mirena IUD and in 10 years for Copper IUD.    Counseling lasted approximately 15 minutes and all her questions were answered.    FOLLOW-UP: With me in four weeks.Psychiatric: She has a normal mood and affect.    Assessment:  Doing well with IUD.    Plan:  Resume normal activity and follow up as scheduled for routine screening.

## 2018-08-06 ENCOUNTER — OFFICE VISIT (OUTPATIENT)
Dept: FAMILY MEDICINE | Facility: CLINIC | Age: 33
End: 2018-08-06
Payer: COMMERCIAL

## 2018-08-06 VITALS
HEART RATE: 72 BPM | SYSTOLIC BLOOD PRESSURE: 116 MMHG | WEIGHT: 155.56 LBS | HEIGHT: 62 IN | BODY MASS INDEX: 28.63 KG/M2 | DIASTOLIC BLOOD PRESSURE: 72 MMHG

## 2018-08-06 DIAGNOSIS — G43.009 MIGRAINE WITHOUT AURA AND WITHOUT STATUS MIGRAINOSUS, NOT INTRACTABLE: ICD-10-CM

## 2018-08-06 DIAGNOSIS — Z00.00 PREVENTATIVE HEALTH CARE: Primary | ICD-10-CM

## 2018-08-06 DIAGNOSIS — L74.512 HYPERHIDROSIS OF PALMS AND SOLES: ICD-10-CM

## 2018-08-06 DIAGNOSIS — L74.513 HYPERHIDROSIS OF PALMS AND SOLES: ICD-10-CM

## 2018-08-06 PROCEDURE — 99999 PR PBB SHADOW E&M-EST. PATIENT-LVL III: CPT | Mod: PBBFAC,,, | Performed by: INTERNAL MEDICINE

## 2018-08-06 PROCEDURE — 99395 PREV VISIT EST AGE 18-39: CPT | Mod: S$GLB,,, | Performed by: INTERNAL MEDICINE

## 2018-08-06 NOTE — PROGRESS NOTES
Assessment and Plan:    1. Preventative health care  Discussed age appropriate preventative healthcare items including avoidance of tobacco, excessive alcohol consumption, and illicit drugs. Discussed safe sex practices. Discussed appropriate use of sunscreen, seatbelts, etc. Discussed maintenance of a healthy weight.   Patient thinks she is UTD on Tdap, has records at home and will try to send this to us.   She will get the flu shot through work ([employer] employee) when available.   She is UTD on Pap.   - Comprehensive metabolic panel; Future  - Lipid panel; Future  - CBC auto differential; Future  - TSH; Future    2. Migraine without aura and without status migrainosus, not intractable  Sees Neurology for this, she is happy to continue to see provider on the [facility] as she works nearby.     3. Hyperhidrosis of palms and soles  We discussed treatment options for this. She will be starting to use topical antiperspirant, there is not a stronger prescription version available beyond what she is already using (carpe lotion). Botox was discussed as an option, but this is typically painful on palms and soles and can cause temporary muscle weakness. If she is interested in this in the future, we can try to find a provider who offers this treatment.         ______________________________________________________________________  Subjective:    Chief Complaint:  Establish care    HPI:  Paige is a 33 y.o. year old woman here to establish care.     She sees Dr. Dubois for Gyn care, she is UTD with Pap (Normal with negative HRHPV in May 2018) and has an IUD in place for contraception.     She notes that she has been sweating a lot, notes that she has always had sweaty palms and feet. Lately she has noticed that she has been sweating more. Sometimes this is emotionally related, but not always.     She has seen Neurology in the past for management of migraine with aura. When last seen in 2016 she had been on magnesium oxide for  migraine ppx with triptan for severe migraines.     She has been trying to exercise more, but notes that her diet is not the greatest. She has been gaining weight since she left the  in 2014.     Past Medical History:  Past Medical History:   Diagnosis Date    Migraine aura without headache     Migraine headache     TMJ arthritis        Past Surgical History:  Past Surgical History:   Procedure Laterality Date    BREAST SURGERY  2008    augmentation       Family History:  Family History   Problem Relation Age of Onset    Hypertension Father     Cancer Maternal Grandfather         bone ca    Hypertension Paternal Grandmother     Heart disease Paternal Grandfather         MI    No Known Problems Mother        Social History:  Social History     Social History    Marital status: Single     Spouse name: N/A    Number of children: N/A    Years of education: N/A     Social History Main Topics    Smoking status: Former Smoker     Quit date: 8/1/2011    Smokeless tobacco: Never Used      Comment: smoked ~18yo to 28 yo    Alcohol use Yes      Comment: Occasional, never heavy drinker    Drug use: No    Sexual activity: Yes     Birth control/ protection: IUD      Comment: Mirena     Other Topics Concern    None     Social History Narrative    , previously worked for the [employer], left in 2013 and 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        Medications:  Current Outpatient Prescriptions on File Prior to Visit   Medication Sig Dispense Refill    azelastine (ASTELIN) 137 mcg (0.1 %) nasal spray 1 spray (137 mcg total) by Nasal route 2 (two) times daily. 30 mL 0    fluticasone (FLONASE) 50 mcg/actuation nasal spray 1 spray by Each Nare route once daily. 16 g 0    ketorolac (TORADOL) 10 mg tablet Take 10 mg by mouth every 6 (six) hours.      levonorgestrel (MIRENA) 20 mcg/24 hr (5 years) IUD 1 each by Intrauterine route once.    "    No current facility-administered medications on file prior to visit.        Allergies:  Patient has no known allergies.    Immunizations:    There is no immunization history on file for this patient.    Review of Systems:  Review of Systems   Constitutional: Negative for activity change and unexpected weight change.   HENT: Negative for hearing loss, rhinorrhea and trouble swallowing.    Eyes: Negative for discharge and visual disturbance.   Respiratory: Negative for chest tightness and wheezing.    Cardiovascular: Negative for chest pain and palpitations.   Gastrointestinal: Negative for blood in stool, constipation, diarrhea and vomiting.   Endocrine: Negative for polydipsia and polyuria.   Genitourinary: Negative for difficulty urinating, dysuria, hematuria and menstrual problem.   Musculoskeletal: Negative for arthralgias, joint swelling and neck pain.   Neurological: Negative for weakness and headaches.   Psychiatric/Behavioral: Negative for confusion and dysphoric mood.     Entered by patient and reviewed and updated during visit    Objective:    Vitals:  Vitals:    08/06/18 1049   BP: 116/72   Pulse: 72   Weight: 70.5 kg (155 lb 8.6 oz)   Height: 5' 2" (1.575 m)   PainSc: 0-No pain       Physical Exam   Constitutional: She is oriented to person, place, and time. She appears well-developed and well-nourished. No distress.   HENT:   Mouth/Throat: Oropharynx is clear and moist. No oropharyngeal exudate.   Eyes: Conjunctivae are normal. Right eye exhibits no discharge. Left eye exhibits no discharge. No scleral icterus.   Neck: Neck supple. No tracheal deviation present. No thyromegaly present.   Cardiovascular: Normal rate, regular rhythm, normal heart sounds and intact distal pulses.    No murmur heard.  Pulmonary/Chest: Effort normal and breath sounds normal. No respiratory distress. She has no wheezes. She has no rales.   Abdominal: Soft. Bowel sounds are normal. She exhibits no distension. There is no " tenderness. No hernia.   Musculoskeletal: She exhibits no edema.   Lymphadenopathy:     She has no cervical adenopathy.   Neurological: She is alert and oriented to person, place, and time.   Skin: Skin is warm and dry. She is not diaphoretic.   Psychiatric: She has a normal mood and affect. Her behavior is normal. Judgment normal.   Vitals reviewed.      Data:  Previous labs reviewed and pertinent for normal TSH in 2015.        Megan Sierra MD  Internal Medicine

## 2019-01-23 ENCOUNTER — PATIENT MESSAGE (OUTPATIENT)
Dept: NEUROLOGY | Facility: CLINIC | Age: 34
End: 2019-01-23

## 2019-01-23 DIAGNOSIS — G43.009 MIGRAINE WITHOUT AURA AND WITHOUT STATUS MIGRAINOSUS, NOT INTRACTABLE: Primary | ICD-10-CM

## 2019-01-23 RX ORDER — KETOROLAC TROMETHAMINE 10 MG/1
10 TABLET, FILM COATED ORAL EVERY 6 HOURS
Qty: 20 TABLET | Refills: 0 | Status: SHIPPED | OUTPATIENT
Start: 2019-01-23 | End: 2021-03-22

## 2019-01-27 ENCOUNTER — PATIENT MESSAGE (OUTPATIENT)
Dept: NEUROLOGY | Facility: CLINIC | Age: 34
End: 2019-01-27

## 2019-01-29 ENCOUNTER — OFFICE VISIT (OUTPATIENT)
Dept: OPTOMETRY | Facility: CLINIC | Age: 34
End: 2019-01-29
Payer: COMMERCIAL

## 2019-01-29 DIAGNOSIS — H52.13 MYOPIA OF BOTH EYES: ICD-10-CM

## 2019-01-29 DIAGNOSIS — Z01.00 ROUTINE EYE EXAM: Primary | ICD-10-CM

## 2019-01-29 PROCEDURE — 92015 PR REFRACTION: ICD-10-PCS | Mod: S$GLB,,, | Performed by: OPTOMETRIST

## 2019-01-29 PROCEDURE — 92004 PR EYE EXAM, NEW PATIENT,COMPREHESV: ICD-10-PCS | Mod: S$GLB,,, | Performed by: OPTOMETRIST

## 2019-01-29 PROCEDURE — 92004 COMPRE OPH EXAM NEW PT 1/>: CPT | Mod: S$GLB,,, | Performed by: OPTOMETRIST

## 2019-01-29 PROCEDURE — 99999 PR PBB SHADOW E&M-EST. PATIENT-LVL II: ICD-10-PCS | Mod: PBBFAC,,, | Performed by: OPTOMETRIST

## 2019-01-29 PROCEDURE — 92015 DETERMINE REFRACTIVE STATE: CPT | Mod: S$GLB,,, | Performed by: OPTOMETRIST

## 2019-01-29 PROCEDURE — 99999 PR PBB SHADOW E&M-EST. PATIENT-LVL II: CPT | Mod: PBBFAC,,, | Performed by: OPTOMETRIST

## 2019-01-29 NOTE — PROGRESS NOTES
HPI     Last eye exam was 2/5/15 with Dr. De Leon.  Patient states no vision changes since last exam. Only wears glasses   part-time and rarely wears SCL's. Glasses broke and wanted to get an   updated rx. Has migraines but has had them for over 15 years.   Patient denies diplopia, flashes/floaters, and pain.      Last edited by Ada Bishop on 1/29/2019  1:38 PM. (History)            Assessment /Plan     For exam results, see Encounter Report.    Routine eye exam    Myopia of both eyes            1-2.  Glasses rx given.  Eye health normal OU.   RTC 2 years for routine exam.

## 2019-02-04 ENCOUNTER — OFFICE VISIT (OUTPATIENT)
Dept: FAMILY MEDICINE | Facility: CLINIC | Age: 34
End: 2019-02-04
Payer: COMMERCIAL

## 2019-02-04 VITALS
HEART RATE: 64 BPM | BODY MASS INDEX: 25.55 KG/M2 | DIASTOLIC BLOOD PRESSURE: 70 MMHG | WEIGHT: 138.88 LBS | SYSTOLIC BLOOD PRESSURE: 118 MMHG | HEIGHT: 62 IN

## 2019-02-04 DIAGNOSIS — J06.9 UPPER RESPIRATORY TRACT INFECTION, UNSPECIFIED TYPE: ICD-10-CM

## 2019-02-04 DIAGNOSIS — R09.81 NASAL CONGESTION: Primary | ICD-10-CM

## 2019-02-04 DIAGNOSIS — J32.9 SINUSITIS, UNSPECIFIED CHRONICITY, UNSPECIFIED LOCATION: ICD-10-CM

## 2019-02-04 PROCEDURE — 99999 PR PBB SHADOW E&M-EST. PATIENT-LVL III: CPT | Mod: PBBFAC,,, | Performed by: INTERNAL MEDICINE

## 2019-02-04 PROCEDURE — 99213 PR OFFICE/OUTPT VISIT, EST, LEVL III, 20-29 MIN: ICD-10-PCS | Mod: S$GLB,,, | Performed by: INTERNAL MEDICINE

## 2019-02-04 PROCEDURE — 99213 OFFICE O/P EST LOW 20 MIN: CPT | Mod: S$GLB,,, | Performed by: INTERNAL MEDICINE

## 2019-02-04 PROCEDURE — 3008F PR BODY MASS INDEX (BMI) DOCUMENTED: ICD-10-PCS | Mod: CPTII,S$GLB,, | Performed by: INTERNAL MEDICINE

## 2019-02-04 PROCEDURE — 3008F BODY MASS INDEX DOCD: CPT | Mod: CPTII,S$GLB,, | Performed by: INTERNAL MEDICINE

## 2019-02-04 PROCEDURE — 99999 PR PBB SHADOW E&M-EST. PATIENT-LVL III: ICD-10-PCS | Mod: PBBFAC,,, | Performed by: INTERNAL MEDICINE

## 2019-02-04 RX ORDER — FLUTICASONE PROPIONATE 50 MCG
1 SPRAY, SUSPENSION (ML) NASAL DAILY
Qty: 16 G | Refills: 0 | Status: SHIPPED | OUTPATIENT
Start: 2019-02-04 | End: 2021-09-26

## 2019-02-04 NOTE — PATIENT INSTRUCTIONS
You have an upper respiratory infection, also known as a cold. Based on my exam today, I do not think that you need antibiotics, and I do not think that they would help you. This type of infection commonly causes runny nose, nasal congestion, sinus pressure, ear pressure, sore throat, and cough.     To help with these symptoms, there are many medications and other treatments available without a prescription. If you choose to take any over the counter cold medicine, read the label, as they can contain many different medications to help with different symptoms. Make sure that any over the counter cold medications are safe to take with medical conditions that you may have.     You can also try a nasal rinse to help with your runny nose, sinus pressure, and congestion. For nasal rinses, you can buy a kit over the counter (I recommend [employer]) that allows you to spray salt water up into your nose and sinuses to rinse out mucus, allergens, and irritants. Follow the instructions on whichever kit you buy, paying attention to using distilled, boiled, or bottled water. Tap water can contain a parasite that it dangerous to spray up into the nose. You can try using this twice a day.     If you are having sore throat, you can try gargling with salt water. Heat water to dissolve salt to the point where you cant dissolve any more. Cool the water back to lukewarm and gargle with this solution. This can help with swelling in your throat.

## 2019-02-04 NOTE — PROGRESS NOTES
Assessment and Plan:    1. Nasal congestion    2. Upper respiratory tract infection, unspecified type    3. Sinusitis, unspecified chronicity, unspecified location  - fluticasone (FLONASE) 50 mcg/actuation nasal spray; 1 spray (50 mcg total) by Each Nare route once daily.  Dispense: 16 g; Refill: 0    Suspect some degree of chronic sinusitis due to allergies with a month of mild symptoms, likely with superimposed URI with the worsening of symptoms including cough and sore throat in the last few days. Discussed starting with nasal rinses, flonase, and OTC decongestants. If not improving at all or worsening (such as having fevers, focal pain/pressure in her sinuses) she will let me know before the weekend. Discussed possible addition of augmentin if she is worsening rather than improving.   ______________________________________________________________________  Subjective:    Chief Complaint:  URI symptoms    HPI:  Paige is a 33 y.o. year old woman here for evaluation of URI symptoms.     Symptoms initially started about 4 weeks ago with congestion. Reports that the mucus in her nose was very thick, having to blow her nose a lot. She has continued to have green and sticky nasal discharge. Starting yesterday has been having cough, dry sore throat, and sensation of swollen glands. Having a lot of facial pressure but not localized to one area. Her daughter has been sick with similar symptoms in the last few days. She has no had any fever or chills.     She has been using sudafed (not sure if this is PE) as well as ibuprofen. She has not been using flonase or astelin recently. Has only used this in the past during the winter, not really sure if she has allergies but has had benefit using these medications in the past.     Medications:  Current Outpatient Medications on File Prior to Visit   Medication Sig Dispense Refill    azelastine (ASTELIN) 137 mcg (0.1 %) nasal spray 1 spray (137 mcg total) by Nasal route 2 (two)  "times daily. 30 mL 0    fluticasone (FLONASE) 50 mcg/actuation nasal spray 1 spray by Each Nare route once daily. 16 g 0    ketorolac (TORADOL) 10 mg tablet Take 1 tablet (10 mg total) by mouth every 6 (six) hours. 20 tablet 0    levonorgestrel (MIRENA) 20 mcg/24 hr (5 years) IUD 1 each by Intrauterine route once.       No current facility-administered medications on file prior to visit.        Review of Systems:  Review of Systems   Constitutional: Negative for chills and fever.   HENT: Positive for congestion, nosebleeds, postnasal drip, rhinorrhea, sinus pressure, sneezing and sore throat. Negative for ear discharge, ear pain, facial swelling and trouble swallowing.    Respiratory: Positive for cough. Negative for shortness of breath and wheezing.        Past Medical History:  Past Medical History:   Diagnosis Date    Migraine aura without headache     Migraine headache     TMJ arthritis        Objective:    Vitals:  Vitals:    02/04/19 1048   BP: 118/70   Pulse: 64   Weight: 63 kg (138 lb 14.2 oz)   Height: 5' 2" (1.575 m)   PainSc:   3   PainLoc: Throat       Physical Exam   Constitutional: She is oriented to person, place, and time. She appears well-developed and well-nourished. No distress.   HENT:   Nose: Mucosal edema and rhinorrhea present. Right sinus exhibits no maxillary sinus tenderness and no frontal sinus tenderness. Left sinus exhibits no maxillary sinus tenderness and no frontal sinus tenderness.   Mouth/Throat: Posterior oropharyngeal erythema present. No posterior oropharyngeal edema. Tonsils are 2+ on the right. Tonsils are 2+ on the left.   Eyes: Conjunctivae are normal. Right eye exhibits no discharge. Left eye exhibits no discharge.   Neck:   bilateral small tender submandibular LNs noted   Cardiovascular: Normal rate and regular rhythm.   Pulmonary/Chest: Effort normal. No respiratory distress. She has no wheezes. She has no rales.   Neurological: She is alert and oriented to person, " place, and time.   Skin: Skin is warm and dry.   Psychiatric: She has a normal mood and affect. Her behavior is normal. Judgment and thought content normal.   Vitals reviewed.      Megan Sierra MD  Internal Medicine

## 2019-02-08 ENCOUNTER — PATIENT MESSAGE (OUTPATIENT)
Dept: FAMILY MEDICINE | Facility: CLINIC | Age: 34
End: 2019-02-08

## 2019-02-08 DIAGNOSIS — B96.89 ACUTE BACTERIAL SINUSITIS: Primary | ICD-10-CM

## 2019-02-08 DIAGNOSIS — J01.90 ACUTE BACTERIAL SINUSITIS: Primary | ICD-10-CM

## 2019-02-08 RX ORDER — AMOXICILLIN AND CLAVULANATE POTASSIUM 875; 125 MG/1; MG/1
1 TABLET, FILM COATED ORAL EVERY 12 HOURS
Qty: 14 TABLET | Refills: 0 | Status: SHIPPED | OUTPATIENT
Start: 2019-02-08 | End: 2019-02-15

## 2019-02-08 RX ORDER — AMOXICILLIN AND CLAVULANATE POTASSIUM 875; 125 MG/1; MG/1
1 TABLET, FILM COATED ORAL EVERY 12 HOURS
Qty: 14 TABLET | Refills: 0 | Status: SHIPPED | OUTPATIENT
Start: 2019-02-08 | End: 2019-02-08 | Stop reason: SDUPTHER

## 2019-10-04 ENCOUNTER — OFFICE VISIT (OUTPATIENT)
Dept: FAMILY MEDICINE | Facility: CLINIC | Age: 34
End: 2019-10-04
Payer: OTHER GOVERNMENT

## 2019-10-04 VITALS
HEART RATE: 62 BPM | BODY MASS INDEX: 26.17 KG/M2 | SYSTOLIC BLOOD PRESSURE: 108 MMHG | WEIGHT: 142.19 LBS | DIASTOLIC BLOOD PRESSURE: 68 MMHG | HEIGHT: 62 IN | TEMPERATURE: 98 F | OXYGEN SATURATION: 99 %

## 2019-10-04 DIAGNOSIS — L70.0 ACNE VULGARIS: ICD-10-CM

## 2019-10-04 DIAGNOSIS — J30.2 SEASONAL ALLERGIES: ICD-10-CM

## 2019-10-04 DIAGNOSIS — F41.8 SITUATIONAL ANXIETY: ICD-10-CM

## 2019-10-04 DIAGNOSIS — G43.709 CHRONIC MIGRAINE WITHOUT AURA WITHOUT STATUS MIGRAINOSUS, NOT INTRACTABLE: Primary | ICD-10-CM

## 2019-10-04 PROCEDURE — 99214 PR OFFICE/OUTPT VISIT, EST, LEVL IV, 30-39 MIN: ICD-10-PCS | Mod: S$PBB,,, | Performed by: INTERNAL MEDICINE

## 2019-10-04 PROCEDURE — 99999 PR PBB SHADOW E&M-EST. PATIENT-LVL III: CPT | Mod: PBBFAC,,, | Performed by: INTERNAL MEDICINE

## 2019-10-04 PROCEDURE — 99999 PR PBB SHADOW E&M-EST. PATIENT-LVL III: ICD-10-PCS | Mod: PBBFAC,,, | Performed by: INTERNAL MEDICINE

## 2019-10-04 PROCEDURE — 99213 OFFICE O/P EST LOW 20 MIN: CPT | Mod: PBBFAC,PN | Performed by: INTERNAL MEDICINE

## 2019-10-04 PROCEDURE — 99214 OFFICE O/P EST MOD 30 MIN: CPT | Mod: S$PBB,,, | Performed by: INTERNAL MEDICINE

## 2019-10-04 RX ORDER — VENLAFAXINE HYDROCHLORIDE 37.5 MG/1
CAPSULE, EXTENDED RELEASE ORAL
Qty: 60 CAPSULE | Refills: 2 | Status: SHIPPED | OUTPATIENT
Start: 2019-10-04 | End: 2021-09-26

## 2019-10-04 RX ORDER — RIZATRIPTAN BENZOATE 5 MG/1
TABLET ORAL
Qty: 21 TABLET | Refills: 1 | Status: SHIPPED | OUTPATIENT
Start: 2019-10-04 | End: 2021-01-05 | Stop reason: SDUPTHER

## 2019-10-04 RX ORDER — MINOCYCLINE HYDROCHLORIDE 50 MG/1
CAPSULE ORAL
Qty: 28 CAPSULE | Refills: 0 | Status: SHIPPED | OUTPATIENT
Start: 2019-10-04 | End: 2021-03-22

## 2019-10-04 NOTE — PATIENT INSTRUCTIONS
Chronic migraine without aura without status migrainosus, not intractable; use motrin 400-600 mg 3x aday as needed; stop motrin if toradol used.   Fioricet 1 every 6 hrs as needed for migraine; script given #28 w no refill to try.  Also will prescribe Maxalt for her to try with her more severe migraine headaches  -     rizatriptan (MAXALT) 5 MG tablet; 5 mg po q 4 hr prn migraine; max 3 in a row.  Dispense: 21 tablet; Refill: 1  -     venlafaxine (EFFEXOR-XR) 37.5 MG 24 hr capsule; 37.5 mg XR daily for 5 days then 37.5 mg XR BID  Dispense: 60 capsule; Refill: 2    Situational anxiety; exercise w stress reduction; limit her caffeine.   -     venlafaxine (EFFEXOR-XR) 37.5 MG 24 hr capsule; 37.5 mg XR daily for 5 days then 37.5 mg XR BID  Dispense: 60 capsule; Refill: 2    Seasonal allergies: astelin and flonase as needed for congestion; Mucinex plain to thin her mucus if needed or for any sinus headaches to prevent them from going into migraines and also provide sinus relief     Acne vulgaris: minocin 50 mg a day for up to 4 weeks; topical for acne after that as per Dr Sierra's choice.

## 2019-10-04 NOTE — PROGRESS NOTES
Subjective:       Patient ID: Paige Crespo is a 34 y.o. female.    Chief Complaint: Migraine; Anxiety (past 6 months); and requesting [facility]  Seeing patient today for her PCP Dr. Megan Sierra.  Patient being seen today for migraine headache evaluation.  Headaches have been by temporal in location no precipitation with her menstrual period.  Averaging 15 days a month; mild headaches for 5 days.  Headaches can last several hours to the rest of the day no or a skin leading lytes before hand.  Lights and noise worsened headaches and she has sensitivity to certain smells.  She does have some associated nausea as well.  Topamax has been tried in the past.  She has had a migraine history for 15 years; she has a chronic Advil user Imitrex has been no help in the past.  Treximet as well.  Sumatriptan/naproxen.  She has ketorolac for severe headaches to use.     She has seen Neurology Dr. Edge.  Stresses include children with a 6-year-old girl a 12-year-old son.  She works in [employer] and has a 50 hr work week.  Her marriages and finances or doing okay.    Situational anxiety:  Work has increased stress a fair amount lately with increased responsibilities at present.  Sleeps well at night occasionally up at 3:00 a.m. Know.    Seasonal allergies:  She uses Astelin nasal and Flonase for relief; advised to also add plain Mucinex to thin her mucous for worsened sinus congestion as a sinus headache and going to a migraine    TOTAL TIME: 12:; >50% TIME SPENT ON DISCUSSION, COUNSELING, AND REVIEW.     Review of Systems   Constitutional: Negative for appetite change, fever and unexpected weight change.   HENT: Negative for congestion, postnasal drip and rhinorrhea.         Seasonal allergies   Respiratory: Negative for cough, chest tightness, shortness of breath and wheezing.    Cardiovascular: Negative for chest pain, palpitations and leg swelling.   Gastrointestinal: Negative for abdominal  "pain, blood in stool, constipation, diarrhea, nausea and vomiting.   Genitourinary: Negative for dysuria and hematuria.   Musculoskeletal: Negative for arthralgias and myalgias.   Skin: Negative for rash.   Allergic/Immunologic: Positive for environmental allergies. Negative for food allergies.        Pollen allergy   Neurological: Negative for syncope and weakness.   Hematological: Negative for adenopathy. Does not bruise/bleed easily.   Psychiatric/Behavioral: Negative for dysphoric mood. The patient is not nervous/anxious.         Denies depression but some anxiety.        Objective:      Vitals:    10/04/19 1058   BP: 108/68   BP Location: Right arm   Patient Position: Sitting   BP Method: Medium (Manual)   Pulse: 62   Temp: 98.3 °F (36.8 °C)   TempSrc: Oral   SpO2: 99%   Weight: 64.5 kg (142 lb 3.2 oz)   Height: 5' 2" (1.575 m)     Body mass index is 26.01 kg/m².    Physical Exam   Constitutional: She is oriented to person, place, and time. She appears well-developed and well-nourished.   HENT:   Head: Normocephalic and atraumatic.   throat pink and moist.  Macular papular acne noted the sides of her cheeks.   Eyes: EOM are normal.   Neck: Normal range of motion. Neck supple. No thyromegaly present.   Cardiovascular: Normal rate, regular rhythm and normal heart sounds. Exam reveals no gallop.   No murmur heard.  Pulmonary/Chest: Effort normal and breath sounds normal. No respiratory distress. She has no wheezes. She has no rales.   Abdominal: Soft. Bowel sounds are normal. She exhibits no distension. There is no tenderness. There is no rebound and no guarding.   Musculoskeletal: Normal range of motion. She exhibits no edema.   Lymphadenopathy:     She has no cervical adenopathy.   Neurological: She is alert and oriented to person, place, and time.   CN's II-XII gr intact; smell not checked. MS 5/5 UE's and LE's Patellar DTR 2+; UE's 2+ as well.    Skin: No rash noted.   Facial acne noted papular posterior " cheeks.    Psychiatric: She has a normal mood and affect. Her behavior is normal. Thought content normal.   Vitals reviewed.      Assessment:       1. Chronic migraine without aura without status migrainosus, not intractable    2. Situational anxiety    3. Seasonal allergies    4. Acne vulgaris        Plan:       Chronic migraine without aura without status migrainosus, not intractable; use motrin 400-600 mg 3x aday as needed; stop motrin if toradol used.   Fioricet 1 every 6 hrs as needed for migraine; script given #28 w no refill to try.   -     rizatriptan (MAXALT) 5 MG tablet; 5 mg po q 4 hr prn migraine; max 3 in a row.  Dispense: 21 tablet; Refill: 1  -     venlafaxine (EFFEXOR-XR) 37.5 MG 24 hr capsule; 37.5 mg XR daily for 5 days then 37.5 mg XR BID  Dispense: 60 capsule; Refill: 2    Situational anxiety; exercise w stress reduction; limit her caffeine.  Will see if daily Effexor can reduce her migraines as well as help with situational anxiety  -     venlafaxine (EFFEXOR-XR) 37.5 MG 24 hr capsule; 37.5 mg XR daily for 5 days then 37.5 mg XR BID  Dispense: 60 capsule; Refill: 2    Seasonal allergies: astelin and flonase as needed for congestion; Mucinex plain to thin her mucus if needed.     Acne vulgaris: minocin 50 mg a day for up to 4 weeks; topical for acne after that as per Dr Sierra's choice.

## 2019-12-17 ENCOUNTER — OFFICE VISIT (OUTPATIENT)
Dept: INTERNAL MEDICINE | Facility: CLINIC | Age: 34
End: 2019-12-17
Payer: OTHER GOVERNMENT

## 2019-12-17 ENCOUNTER — TELEPHONE (OUTPATIENT)
Dept: INTERNAL MEDICINE | Facility: CLINIC | Age: 34
End: 2019-12-17

## 2019-12-17 VITALS
SYSTOLIC BLOOD PRESSURE: 108 MMHG | HEART RATE: 72 BPM | TEMPERATURE: 99 F | BODY MASS INDEX: 26.9 KG/M2 | HEIGHT: 62 IN | WEIGHT: 146.19 LBS | OXYGEN SATURATION: 99 % | DIASTOLIC BLOOD PRESSURE: 66 MMHG

## 2019-12-17 DIAGNOSIS — Z20.828 EXPOSURE TO THE FLU: Primary | ICD-10-CM

## 2019-12-17 PROCEDURE — 99999 PR PBB SHADOW E&M-EST. PATIENT-LVL III: CPT | Mod: PBBFAC,,, | Performed by: INTERNAL MEDICINE

## 2019-12-17 PROCEDURE — 99214 OFFICE O/P EST MOD 30 MIN: CPT | Mod: S$PBB,,, | Performed by: INTERNAL MEDICINE

## 2019-12-17 PROCEDURE — 99214 PR OFFICE/OUTPT VISIT, EST, LEVL IV, 30-39 MIN: ICD-10-PCS | Mod: S$PBB,,, | Performed by: INTERNAL MEDICINE

## 2019-12-17 PROCEDURE — 99999 PR PBB SHADOW E&M-EST. PATIENT-LVL III: ICD-10-PCS | Mod: PBBFAC,,, | Performed by: INTERNAL MEDICINE

## 2019-12-17 PROCEDURE — 99213 OFFICE O/P EST LOW 20 MIN: CPT | Mod: PBBFAC | Performed by: INTERNAL MEDICINE

## 2019-12-17 RX ORDER — OSELTAMIVIR PHOSPHATE 75 MG/1
75 CAPSULE ORAL DAILY
Qty: 7 CAPSULE | Refills: 0 | Status: SHIPPED | OUTPATIENT
Start: 2019-12-17 | End: 2019-12-17

## 2019-12-17 RX ORDER — OSELTAMIVIR PHOSPHATE 75 MG/1
75 CAPSULE ORAL DAILY
Qty: 10 CAPSULE | Refills: 0 | Status: SHIPPED | OUTPATIENT
Start: 2019-12-17 | End: 2019-12-27

## 2019-12-17 NOTE — TELEPHONE ENCOUNTER
----- Message from Aliyah Arguello LPN sent at 12/17/2019  2:04 PM CST -----  Contact: [employer] [phone] (Phone)      ----- Message -----  From: Mahogany Slater  Sent: 12/17/2019  12:22 PM CST  To: Khris Abel Staff    Pharmacy is calling to clarify an RX.  RX name:  oseltamivir (TAMIFLU) 75 MG capsule  What do they need to clarify:    Comments:

## 2019-12-17 NOTE — TELEPHONE ENCOUNTER
----- Message from Aliyah Arguello LPN sent at 12/17/2019  2:04 PM CST -----  Contact: [employer] [phone] (Phone)      ----- Message -----  From: Mahogany Slater  Sent: 12/17/2019  12:22 PM CST  To: Khris Abel Staff    Pharmacy is calling to clarify an RX.  RX name:  oseltamivir (TAMIFLU) 75 MG capsule  What do they need to clarify:    Comments:

## 2019-12-17 NOTE — TELEPHONE ENCOUNTER
----- Message from Aliyah Arguello LPN sent at 12/17/2019  2:04 PM CST -----  Contact: [employer] [phone] (Phone)      ----- Message -----  From: Mahogany Slater  Sent: 12/17/2019  12:22 PM CST  To: Khris Abel Staff    Pharmacy is calling to clarify an RX.  RX name:  oseltamivir (TAMIFLU) 75 MG capsule  What do they need to clarify:    Comments:

## 2019-12-17 NOTE — PROGRESS NOTES
INTERNAL MEDICINE CLINIC - SAME DAY APPOINTMENT  Progress Note    PRESENTING HISTORY     PCP: Megan Sierra MD  Chief Complaint/Reason for Visit:     Chief Complaint   Patient presents with    Sore Throat     History of Present Illness & ROS : Ms. Paige Crespo is a 34 y.o. female.      Son has the flu (diagnosed on ).    She had flu vaccine in September this year.     Mild sore throat. No fever or chill.      PAST HISTORY:     Past Medical History:   Diagnosis Date    Migraine aura without headache     Migraine headache     TMJ arthritis        Past Surgical History:   Procedure Laterality Date    BREAST SURGERY  2008    augmentation       Family History   Problem Relation Age of Onset    Hypertension Father     Cancer Maternal Grandfather         bone ca    Hypertension Paternal Grandmother     Heart disease Paternal Grandfather         MI    No Known Problems Mother        Social History     Socioeconomic History    Marital status: Single     Spouse name: Not on file    Number of children: Not on file    Years of education: Not on file    Highest education level: Not on file   Occupational History    Not on file   Social Needs    Financial resource strain: Not on file    Food insecurity:     Worry: Not on file     Inability: Not on file    Transportation needs:     Medical: Not on file     Non-medical: Not on file   Tobacco Use    Smoking status: Former Smoker     Last attempt to quit: 2011     Years since quittin.3    Smokeless tobacco: Never Used    Tobacco comment: smoked ~20yo to 28 yo   Substance and Sexual Activity    Alcohol use: Yes     Comment: Occasional, never heavy drinker    Drug use: No    Sexual activity: Yes     Birth control/protection: IUD     Comment: Mirena   Lifestyle    Physical activity:     Days per week: Not on file     Minutes per session: Not on file    Stress: Not on file   Relationships    Social connections:     Talks on phone: Not  on file     Gets together: Not on file     Attends Jehovah's witness service: Not on file     Active member of club or organization: Not on file     Attends meetings of clubs or organizations: Not on file     Relationship status: Not on file   Other Topics Concern    Not on file   Social History Narrative    , previously worked for [employer], left in 2013 and 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        MEDICATIONS & ALLERGIES:     Current Outpatient Medications on File Prior to Visit   Medication Sig Dispense Refill    azelastine (ASTELIN) 137 mcg (0.1 %) nasal spray 1 spray (137 mcg total) by Nasal route 2 (two) times daily. 30 mL 0    fluticasone (FLONASE) 50 mcg/actuation nasal spray 1 spray (50 mcg total) by Each Nare route once daily. 16 g 0    ketorolac (TORADOL) 10 mg tablet Take 1 tablet (10 mg total) by mouth every 6 (six) hours. 20 tablet 0    levonorgestrel (MIRENA) 20 mcg/24 hr (5 years) IUD 1 each by Intrauterine route once.      minocycline (MINOCIN,DYNACIN) 50 MG Cap 50 mg po daily for her acne; max 4 weeks. 28 capsule 0    rizatriptan (MAXALT) 5 MG tablet 5 mg po q 4 hr prn migraine; max 3 in a row. 21 tablet 1    venlafaxine (EFFEXOR-XR) 37.5 MG 24 hr capsule 37.5 mg XR daily for 5 days then 37.5 mg XR BID 60 capsule 2     No current facility-administered medications on file prior to visit.         Review of patient's allergies indicates:  No Known Allergies    Medications Reconciliation:   I have reconciled the patient's home medications with the patient/family. I have updated all changes.  Refer to After-Visit Medication List.    OBJECTIVE:     Vital Signs:  Vitals:    12/17/19 1201   BP: 108/66   Pulse: 72   Temp: 98.6 °F (37 °C)     Wt Readings from Last 1 Encounters:   12/17/19 1201 66.3 kg (146 lb 2.6 oz)     Body mass index is 26.73 kg/m².     Physical Exam:  General: Well developed, well nourished. No  distress.  HEENT: Head is normocephalic, atraumatic.  Ears: both external ears are normal.  TMs are normal bilaterally.  Nasal turbinates - normal.  Pharynx - mild redness. No exudate.  Sinus - normal.  Eyes: Clear conjunctiva bilaterally.  Neck: Supple, symmetrical neck; trachea midline.  Lymph Nodes: No cervical or supraclavicular adenopathy.  Lungs: Clear to auscultation bilaterally and normal respiratory effort.  Cardiovascular: Heart with regular rate and rhythm.      Laboratory  Lab Results   Component Value Date    WBC 6.81 06/09/2016    HGB 13.4 06/09/2016    HCT 38.7 06/09/2016     06/09/2016    CHOL 178 06/09/2016    TRIG 56 06/09/2016    HDL 53 06/09/2016    ALT 14 06/09/2016    AST 15 06/09/2016     06/09/2016    K 4.3 06/09/2016     06/09/2016    CREATININE 0.8 06/09/2016    BUN 17 06/09/2016    CO2 26 06/09/2016    TSH 1.335 06/09/2016       ASSESSMENT & PLAN:     Exposure to the flu  -     oseltamivir (TAMIFLU) 75 MG capsule; Take 1 capsule (75 mg total) by mouth once daily. for 7 days  Dispense: 7 capsule; Refill: 0    I have also Rx'd her daughter for flu exposure Tamiflu treatment for 7 days.    Also recommend Vitamin C 1000 mg daily for 7 days for her and 250 mg daily for 7 days for her daughter.    After Visit Medication List :     Medication List           Accurate as of December 17, 2019  3:24 PM. If you have any questions, ask your nurse or doctor.               START taking these medications    oseltamivir 75 MG capsule  Commonly known as:  TAMIFLU  Take 1 capsule (75 mg total) by mouth once daily. for 7 days  Started by:  Zane Pinedo MD        CONTINUE taking these medications    azelastine 137 mcg (0.1 %) nasal spray  Commonly known as:  ASTELIN  1 spray (137 mcg total) by Nasal route 2 (two) times daily.     fluticasone propionate 50 mcg/actuation nasal spray  Commonly known as:  FLONASE  1 spray (50 mcg total) by Each Nare route once daily.     ketorolac 10 mg  tablet  Commonly known as:  TORADOL  Take 1 tablet (10 mg total) by mouth every 6 (six) hours.     levonorgestrel 20 mcg/24 hours (5 yrs) 52 mg IUD  Commonly known as:  MIRENA     minocycline 50 MG Cap  Commonly known as:  MINOCIN,DYNACIN  50 mg po daily for her acne; max 4 weeks.     rizatriptan 5 MG tablet  Commonly known as:  MAXALT  5 mg po q 4 hr prn migraine; max 3 in a row.     venlafaxine 37.5 MG 24 hr capsule  Commonly known as:  EFFEXOR-XR  37.5 mg XR daily for 5 days then 37.5 mg XR BID           Where to Get Your Medications      These medications were sent to [facility] [address], [place] [address]    Hours:  Mon-Fri, 8a-5:30p Phone:  [phone]   · oseltamivir 75 MG capsule         Signing Physician:  Zane Pinedo MD

## 2020-04-21 DIAGNOSIS — Z01.84 ANTIBODY RESPONSE EXAMINATION: ICD-10-CM

## 2020-04-30 ENCOUNTER — LAB VISIT (OUTPATIENT)
Dept: LAB | Facility: HOSPITAL | Age: 35
End: 2020-04-30
Attending: INTERNAL MEDICINE
Payer: OTHER GOVERNMENT

## 2020-04-30 DIAGNOSIS — Z01.84 ANTIBODY RESPONSE EXAMINATION: ICD-10-CM

## 2020-04-30 LAB — SARS-COV-2 IGG SERPLBLD QL IA.RAPID: NEGATIVE

## 2020-04-30 PROCEDURE — 36415 COLL VENOUS BLD VENIPUNCTURE: CPT

## 2020-04-30 PROCEDURE — 86769 SARS-COV-2 COVID-19 ANTIBODY: CPT

## 2020-12-23 ENCOUNTER — IMMUNIZATION (OUTPATIENT)
Dept: FAMILY MEDICINE | Facility: CLINIC | Age: 35
End: 2020-12-23
Payer: OTHER GOVERNMENT

## 2020-12-23 DIAGNOSIS — Z23 NEED FOR VACCINATION: ICD-10-CM

## 2020-12-23 PROCEDURE — 91300 COVID-19, MRNA, LNP-S, PF, 30 MCG/0.3 ML DOSE VACCINE: ICD-10-PCS | Mod: ,,, | Performed by: INTERNAL MEDICINE

## 2020-12-23 PROCEDURE — 91300 COVID-19, MRNA, LNP-S, PF, 30 MCG/0.3 ML DOSE VACCINE: CPT | Mod: ,,, | Performed by: INTERNAL MEDICINE

## 2020-12-23 PROCEDURE — 0001A COVID-19, MRNA, LNP-S, PF, 30 MCG/0.3 ML DOSE VACCINE: ICD-10-PCS | Mod: CV19,,, | Performed by: INTERNAL MEDICINE

## 2020-12-23 PROCEDURE — 0001A COVID-19, MRNA, LNP-S, PF, 30 MCG/0.3 ML DOSE VACCINE: CPT | Mod: CV19,,, | Performed by: INTERNAL MEDICINE

## 2021-01-04 ENCOUNTER — PATIENT MESSAGE (OUTPATIENT)
Dept: ADMINISTRATIVE | Facility: HOSPITAL | Age: 36
End: 2021-01-04

## 2021-01-05 DIAGNOSIS — G43.709 CHRONIC MIGRAINE WITHOUT AURA WITHOUT STATUS MIGRAINOSUS, NOT INTRACTABLE: ICD-10-CM

## 2021-01-05 RX ORDER — RIZATRIPTAN BENZOATE 5 MG/1
TABLET ORAL
Qty: 21 TABLET | Refills: 1 | Status: SHIPPED | OUTPATIENT
Start: 2021-01-05 | End: 2021-09-26

## 2021-01-13 ENCOUNTER — IMMUNIZATION (OUTPATIENT)
Dept: FAMILY MEDICINE | Facility: CLINIC | Age: 36
End: 2021-01-13
Payer: OTHER GOVERNMENT

## 2021-01-13 DIAGNOSIS — Z23 NEED FOR VACCINATION: ICD-10-CM

## 2021-01-13 PROCEDURE — 91300 COVID-19, MRNA, LNP-S, PF, 30 MCG/0.3 ML DOSE VACCINE: CPT | Mod: PBBFAC,PO

## 2021-01-13 PROCEDURE — 0002A COVID-19, MRNA, LNP-S, PF, 30 MCG/0.3 ML DOSE VACCINE: CPT | Mod: PBBFAC,PO

## 2021-03-22 ENCOUNTER — HOSPITAL ENCOUNTER (OUTPATIENT)
Dept: RADIOLOGY | Facility: HOSPITAL | Age: 36
Discharge: HOME | End: 2021-03-22
Attending: INTERNAL MEDICINE
Payer: OTHER GOVERNMENT

## 2021-03-22 ENCOUNTER — OFFICE VISIT (OUTPATIENT)
Dept: FAMILY MEDICINE | Facility: CLINIC | Age: 36
End: 2021-03-22
Payer: OTHER GOVERNMENT

## 2021-03-22 VITALS
HEART RATE: 91 BPM | BODY MASS INDEX: 28.39 KG/M2 | WEIGHT: 154.31 LBS | OXYGEN SATURATION: 99 % | TEMPERATURE: 97 F | DIASTOLIC BLOOD PRESSURE: 87 MMHG | HEIGHT: 62 IN | SYSTOLIC BLOOD PRESSURE: 118 MMHG

## 2021-03-22 DIAGNOSIS — M67.432 GANGLION CYST OF DORSUM OF LEFT WRIST: ICD-10-CM

## 2021-03-22 DIAGNOSIS — M25.532 LEFT WRIST PAIN: ICD-10-CM

## 2021-03-22 DIAGNOSIS — Z00.00 PREVENTATIVE HEALTH CARE: ICD-10-CM

## 2021-03-22 DIAGNOSIS — R41.840 DIFFICULTY CONCENTRATING: Primary | ICD-10-CM

## 2021-03-22 PROCEDURE — 73110 XR WRIST COMPLETE 3 VIEWS LEFT: ICD-10-PCS | Mod: 26,LT,, | Performed by: RADIOLOGY

## 2021-03-22 PROCEDURE — 99214 PR OFFICE/OUTPT VISIT, EST, LEVL IV, 30-39 MIN: ICD-10-PCS | Mod: S$PBB,,, | Performed by: INTERNAL MEDICINE

## 2021-03-22 PROCEDURE — 99999 PR PBB SHADOW E&M-EST. PATIENT-LVL V: ICD-10-PCS | Mod: PBBFAC,,, | Performed by: INTERNAL MEDICINE

## 2021-03-22 PROCEDURE — 99999 PR PBB SHADOW E&M-EST. PATIENT-LVL V: CPT | Mod: PBBFAC,,, | Performed by: INTERNAL MEDICINE

## 2021-03-22 PROCEDURE — 99215 OFFICE O/P EST HI 40 MIN: CPT | Mod: PBBFAC,PN,25 | Performed by: INTERNAL MEDICINE

## 2021-03-22 PROCEDURE — 73110 X-RAY EXAM OF WRIST: CPT | Mod: 26,LT,, | Performed by: RADIOLOGY

## 2021-03-22 PROCEDURE — 73110 X-RAY EXAM OF WRIST: CPT | Mod: TC,PN,LT

## 2021-03-22 PROCEDURE — 99214 OFFICE O/P EST MOD 30 MIN: CPT | Mod: S$PBB,,, | Performed by: INTERNAL MEDICINE

## 2021-04-07 ENCOUNTER — OFFICE VISIT (OUTPATIENT)
Dept: ORTHOPEDICS | Facility: CLINIC | Age: 36
End: 2021-04-07
Payer: OTHER GOVERNMENT

## 2021-04-07 VITALS
HEART RATE: 77 BPM | HEIGHT: 62 IN | SYSTOLIC BLOOD PRESSURE: 123 MMHG | DIASTOLIC BLOOD PRESSURE: 66 MMHG | BODY MASS INDEX: 28.34 KG/M2 | WEIGHT: 154 LBS

## 2021-04-07 DIAGNOSIS — M67.432 GANGLION CYST OF DORSUM OF LEFT WRIST: ICD-10-CM

## 2021-04-07 DIAGNOSIS — M25.532 LEFT WRIST PAIN: ICD-10-CM

## 2021-04-07 PROCEDURE — 99203 OFFICE O/P NEW LOW 30 MIN: CPT | Mod: S$PBB,,, | Performed by: ORTHOPAEDIC SURGERY

## 2021-04-07 PROCEDURE — 99203 PR OFFICE/OUTPT VISIT, NEW, LEVL III, 30-44 MIN: ICD-10-PCS | Mod: S$PBB,,, | Performed by: ORTHOPAEDIC SURGERY

## 2021-04-07 PROCEDURE — 99999 PR PBB SHADOW E&M-EST. PATIENT-LVL III: ICD-10-PCS | Mod: PBBFAC,,, | Performed by: ORTHOPAEDIC SURGERY

## 2021-04-07 PROCEDURE — 99213 OFFICE O/P EST LOW 20 MIN: CPT | Mod: PBBFAC,PN | Performed by: ORTHOPAEDIC SURGERY

## 2021-04-07 PROCEDURE — 99999 PR PBB SHADOW E&M-EST. PATIENT-LVL III: CPT | Mod: PBBFAC,,, | Performed by: ORTHOPAEDIC SURGERY

## 2021-05-13 ENCOUNTER — CLINICAL SUPPORT (OUTPATIENT)
Dept: OTHER | Facility: CLINIC | Age: 36
End: 2021-05-13

## 2021-05-13 DIAGNOSIS — Z00.8 ENCOUNTER FOR OTHER GENERAL EXAMINATION: ICD-10-CM

## 2021-05-14 VITALS — HEIGHT: 62 IN

## 2021-05-14 LAB
GLUCOSE SERPL-MCNC: 83 MG/DL (ref 60–140)
HDLC SERPL-MCNC: 57 MG/DL
POC CHOLESTEROL, LDL (DOCK): 89 MG/DL
POC CHOLESTEROL, TOTAL: 169 MG/DL
TRIGL SERPL-MCNC: 117 MG/DL

## 2021-06-14 ENCOUNTER — PATIENT MESSAGE (OUTPATIENT)
Dept: FAMILY MEDICINE | Facility: CLINIC | Age: 36
End: 2021-06-14

## 2021-06-14 DIAGNOSIS — M62.838 MUSCLE SPASM: Primary | ICD-10-CM

## 2021-06-14 RX ORDER — CYCLOBENZAPRINE HCL 5 MG
5 TABLET ORAL 3 TIMES DAILY PRN
Qty: 10 TABLET | Refills: 0 | Status: SHIPPED | OUTPATIENT
Start: 2021-06-14 | End: 2021-06-24

## 2021-08-14 ENCOUNTER — PATIENT MESSAGE (OUTPATIENT)
Dept: FAMILY MEDICINE | Facility: CLINIC | Age: 36
End: 2021-08-14

## 2021-09-16 ENCOUNTER — OFFICE VISIT (OUTPATIENT)
Dept: FAMILY MEDICINE | Facility: CLINIC | Age: 36
End: 2021-09-16
Payer: OTHER GOVERNMENT

## 2021-09-16 VITALS
WEIGHT: 145.19 LBS | OXYGEN SATURATION: 98 % | DIASTOLIC BLOOD PRESSURE: 72 MMHG | SYSTOLIC BLOOD PRESSURE: 116 MMHG | HEART RATE: 85 BPM | BODY MASS INDEX: 26.55 KG/M2

## 2021-09-16 DIAGNOSIS — S16.1XXA STRAIN OF NECK MUSCLE, INITIAL ENCOUNTER: ICD-10-CM

## 2021-09-16 DIAGNOSIS — L70.0 ACNE VULGARIS: Primary | ICD-10-CM

## 2021-09-16 PROCEDURE — 99213 OFFICE O/P EST LOW 20 MIN: CPT | Mod: PBBFAC,PN | Performed by: NURSE PRACTITIONER

## 2021-09-16 PROCEDURE — 99214 PR OFFICE/OUTPT VISIT, EST, LEVL IV, 30-39 MIN: ICD-10-PCS | Mod: S$PBB,,, | Performed by: NURSE PRACTITIONER

## 2021-09-16 PROCEDURE — 99214 OFFICE O/P EST MOD 30 MIN: CPT | Mod: S$PBB,,, | Performed by: NURSE PRACTITIONER

## 2021-09-16 PROCEDURE — 99999 PR PBB SHADOW E&M-EST. PATIENT-LVL III: CPT | Mod: PBBFAC,,, | Performed by: NURSE PRACTITIONER

## 2021-09-16 PROCEDURE — 99999 PR PBB SHADOW E&M-EST. PATIENT-LVL III: ICD-10-PCS | Mod: PBBFAC,,, | Performed by: NURSE PRACTITIONER

## 2021-09-16 RX ORDER — MINOCYCLINE HYDROCHLORIDE 50 MG/1
50 CAPSULE ORAL DAILY
Qty: 30 CAPSULE | Refills: 0 | Status: SHIPPED | OUTPATIENT
Start: 2021-09-16 | End: 2021-10-16

## 2021-09-22 ENCOUNTER — CLINICAL SUPPORT (OUTPATIENT)
Dept: REHABILITATION | Facility: HOSPITAL | Age: 36
End: 2021-09-22
Payer: OTHER GOVERNMENT

## 2021-09-22 DIAGNOSIS — M54.2 ACUTE NECK PAIN: ICD-10-CM

## 2021-09-22 DIAGNOSIS — S16.1XXA STRAIN OF NECK MUSCLE, INITIAL ENCOUNTER: ICD-10-CM

## 2021-09-22 DIAGNOSIS — R29.898 SHOULDER WEAKNESS: ICD-10-CM

## 2021-09-22 PROCEDURE — 97140 MANUAL THERAPY 1/> REGIONS: CPT | Mod: PO

## 2021-09-22 PROCEDURE — 97161 PT EVAL LOW COMPLEX 20 MIN: CPT | Mod: PO

## 2021-09-24 ENCOUNTER — CLINICAL SUPPORT (OUTPATIENT)
Dept: REHABILITATION | Facility: HOSPITAL | Age: 36
End: 2021-09-24
Payer: OTHER GOVERNMENT

## 2021-09-24 DIAGNOSIS — R29.898 SHOULDER WEAKNESS: ICD-10-CM

## 2021-09-24 DIAGNOSIS — M54.2 ACUTE NECK PAIN: ICD-10-CM

## 2021-09-24 PROCEDURE — 97140 MANUAL THERAPY 1/> REGIONS: CPT | Mod: PO,CQ

## 2021-09-24 PROCEDURE — 97110 THERAPEUTIC EXERCISES: CPT | Mod: PO,CQ

## 2021-10-04 ENCOUNTER — CLINICAL SUPPORT (OUTPATIENT)
Dept: REHABILITATION | Facility: HOSPITAL | Age: 36
End: 2021-10-04
Payer: OTHER GOVERNMENT

## 2021-10-04 DIAGNOSIS — R29.898 SHOULDER WEAKNESS: ICD-10-CM

## 2021-10-04 DIAGNOSIS — M54.2 ACUTE NECK PAIN: Primary | ICD-10-CM

## 2021-10-04 PROCEDURE — 97110 THERAPEUTIC EXERCISES: CPT | Mod: PO,CQ

## 2021-11-04 ENCOUNTER — DOCUMENTATION ONLY (OUTPATIENT)
Dept: REHABILITATION | Facility: HOSPITAL | Age: 36
End: 2021-11-04
Payer: OTHER GOVERNMENT

## 2021-11-04 PROBLEM — R29.898 SHOULDER WEAKNESS: Status: RESOLVED | Noted: 2021-09-22 | Resolved: 2021-11-04

## 2021-11-04 PROBLEM — M54.2 ACUTE NECK PAIN: Status: RESOLVED | Noted: 2021-09-22 | Resolved: 2021-11-04

## 2021-12-28 ENCOUNTER — OFFICE VISIT (OUTPATIENT)
Dept: FAMILY MEDICINE | Facility: CLINIC | Age: 36
End: 2021-12-28
Payer: OTHER GOVERNMENT

## 2021-12-28 DIAGNOSIS — M25.512 ACUTE PAIN OF LEFT SHOULDER: Primary | ICD-10-CM

## 2021-12-28 PROCEDURE — 99202 PR OFFICE/OUTPT VISIT, NEW, LEVL II, 15-29 MIN: ICD-10-PCS | Mod: 95,,, | Performed by: PHYSICIAN ASSISTANT

## 2021-12-28 PROCEDURE — 99202 OFFICE O/P NEW SF 15 MIN: CPT | Mod: 95,,, | Performed by: PHYSICIAN ASSISTANT

## 2021-12-28 RX ORDER — MELOXICAM 15 MG/1
15 TABLET ORAL DAILY
Qty: 30 TABLET | Refills: 0 | Status: SHIPPED | OUTPATIENT
Start: 2021-12-28 | End: 2023-08-15

## 2021-12-29 ENCOUNTER — HOSPITAL ENCOUNTER (OUTPATIENT)
Dept: RADIOLOGY | Facility: HOSPITAL | Age: 36
Discharge: HOME | End: 2021-12-29
Attending: PHYSICIAN ASSISTANT
Payer: OTHER GOVERNMENT

## 2021-12-29 DIAGNOSIS — M25.512 ACUTE PAIN OF LEFT SHOULDER: ICD-10-CM

## 2021-12-29 PROCEDURE — 73030 XR SHOULDER COMPLETE 2 OR MORE VIEWS LEFT: ICD-10-PCS | Mod: 26,LT,, | Performed by: RADIOLOGY

## 2021-12-29 PROCEDURE — 73030 X-RAY EXAM OF SHOULDER: CPT | Mod: TC,FY,PO,LT

## 2021-12-29 PROCEDURE — 73030 X-RAY EXAM OF SHOULDER: CPT | Mod: 26,LT,, | Performed by: RADIOLOGY

## 2022-01-07 ENCOUNTER — OFFICE VISIT (OUTPATIENT)
Dept: ORTHOPEDICS | Facility: CLINIC | Age: 37
End: 2022-01-07
Payer: OTHER GOVERNMENT

## 2022-01-07 VITALS — HEIGHT: 62 IN | BODY MASS INDEX: 28.29 KG/M2 | WEIGHT: 153.75 LBS

## 2022-01-07 DIAGNOSIS — M75.42 IMPINGEMENT SYNDROME OF SHOULDER, LEFT: Primary | ICD-10-CM

## 2022-01-07 DIAGNOSIS — M25.512 ACUTE PAIN OF LEFT SHOULDER: ICD-10-CM

## 2022-01-07 PROCEDURE — 99213 PR OFFICE/OUTPT VISIT, EST, LEVL III, 20-29 MIN: ICD-10-PCS | Mod: 25,S$PBB,, | Performed by: PHYSICIAN ASSISTANT

## 2022-01-07 PROCEDURE — 99213 OFFICE O/P EST LOW 20 MIN: CPT | Mod: PBBFAC,25 | Performed by: PHYSICIAN ASSISTANT

## 2022-01-07 PROCEDURE — 20610 DRAIN/INJ JOINT/BURSA W/O US: CPT | Mod: PBBFAC,LT | Performed by: PHYSICIAN ASSISTANT

## 2022-01-07 PROCEDURE — 99999 PR PBB SHADOW E&M-EST. PATIENT-LVL III: ICD-10-PCS | Mod: PBBFAC,,, | Performed by: PHYSICIAN ASSISTANT

## 2022-01-07 PROCEDURE — 99213 OFFICE O/P EST LOW 20 MIN: CPT | Mod: 25,S$PBB,, | Performed by: PHYSICIAN ASSISTANT

## 2022-01-07 PROCEDURE — 20610 LARGE JOINT ASPIRATION/INJECTION: L SUBACROMIAL BURSA: ICD-10-PCS | Mod: S$PBB,LT,, | Performed by: PHYSICIAN ASSISTANT

## 2022-01-07 PROCEDURE — 99999 PR PBB SHADOW E&M-EST. PATIENT-LVL III: CPT | Mod: PBBFAC,,, | Performed by: PHYSICIAN ASSISTANT

## 2022-01-07 PROCEDURE — 20610 DRAIN/INJ JOINT/BURSA W/O US: CPT | Mod: S$PBB,LT,, | Performed by: PHYSICIAN ASSISTANT

## 2022-01-07 RX ORDER — TRIAMCINOLONE ACETONIDE 40 MG/ML
40 INJECTION, SUSPENSION INTRA-ARTICULAR; INTRAMUSCULAR
Status: DISCONTINUED | OUTPATIENT
Start: 2022-01-07 | End: 2022-01-07 | Stop reason: HOSPADM

## 2022-01-07 RX ADMIN — TRIAMCINOLONE ACETONIDE 40 MG: 40 INJECTION, SUSPENSION INTRA-ARTICULAR; INTRAMUSCULAR at 08:01

## 2022-01-07 NOTE — PROGRESS NOTES
SUBJECTIVE:     Chief Complaint & History of Present Illness:  Paige Barros is a 36 y.o. year old female who presents today with constant left shoulder pain that started about 2 weeks ago.  She states that she is very active and believes her pain started after doing some weight training.  There is no specific trauma.  The pain is located in the upper arm.  The pain is described as achy.  It is aggravated by reaching, lifting, overhead activity.  She has had x-rays and has tried rest.  She has not tried any medication.  There is no history of prior injury or surgery, however about 9 years ago she did have right shoulder arthroscopy.     Review of patient's allergies indicates:  No Known Allergies      Current Outpatient Medications   Medication Sig Dispense Refill    levonorgestrel (MIRENA) 20 mcg/24 hr (5 years) IUD 1 each by Intrauterine route once.      meloxicam (MOBIC) 15 MG tablet Take 1 tablet (15 mg total) by mouth once daily. 30 tablet 0    azelastine (ASTELIN) 137 mcg (0.1 %) nasal spray 1 spray (137 mcg total) by Nasal route 2 (two) times daily. (Patient taking differently: 1 spray by Nasal route 2 (two) times daily. ) 30 mL 0     No current facility-administered medications for this visit.       Past Medical History:   Diagnosis Date    Migraine aura without headache     Migraine headache     TMJ arthritis        Past Surgical History:   Procedure Laterality Date    BREAST SURGERY  2008    augmentation       Vital Signs (Most Recent)  There were no vitals filed for this visit.    Review of Systems:  ROS:  Constitutional: no fever or chills  Eyes: no visual changes  ENT: no nasal congestion or sore throat  Respiratory: no cough or shortness of breath  Cardiovascular: no chest pain or palpitations  Musculoskeletal: no arthralgias or myalgias  Neurological: no seizures or tremors  Behavioral/Psych: no auditory or visual hallucinations      OBJECTIVE:     PHYSICAL EXAM:      General: Weight:  69.7 kg (153 lb 12.3 oz) Body mass index is 28.13 kg/m².  Patient is alert, awake and oriented to time, place and person. Mood and affect are appropriate.  Patient does not appear to be in any distress, denies any constitutional symptoms and appears stated age.   HEENT: Pupils are equal and round, sclera are not injected. External examination of ears and nose reveals no abnormalities. Cranial nerves II-X are grossly intact  Neck: examination demonstrates painless active range of motion. Spurling's sign is negative  Skin: no rashes, abrasions or open wounds on the affected extremity   Resp: No respiratory distress or audible wheezing   Psych:  normal mood and behavior  CV: 2+ pulses, all extremities warm and well perfused   Left Shoulder   Skin intact, no warmth or erythema  Tenderness: none  Range of motion is painful   ROM: forward flexion 180/180, external rotation 50/50, internal rotation T7/T7  Shoulder Strength: biceps 5/5, triceps 5/5, abduction 5/5, adduction 5/5  positive for impingement sign, sensory exam normal and motor exam normal  Special Tests:    Crossbody test: positive    Neer's positive  Hawkin's positive    Cornelius's positive  Drop arm negative    IMAGING:  X-rays of the left shoulder, personally reviewed by me, demonstrate well maintained joint space.  No fracture or dislocation.    ASSESSMENT/PLAN:   36 y.o. year old female with left shoulder subacromial bursitis    Plan: Discussed with the patient at length all the different treatment options available for her left shoulder including anti-inflammatories, acetaminophen, rest, ice, Physical therapy, occasional cortisone injections for temporary relief, and shoulder arthroscopy    - Recommend continue rest, activity modification, home exercise program  - Left shoulder CSI today, see procedure note  - NSAIDS as needed  - Follow up 2-3 weeks if symptoms persist- consider MRI

## 2022-01-07 NOTE — PROCEDURES
Large Joint Aspiration/Injection: L subacromial bursa    Date/Time: 1/7/2022 8:00 AM  Performed by: Jocelyne Matthews PA-C  Authorized by: Jocelyne Matthews PA-C     Consent Done?:  Yes (Verbal)  Indications:  Pain  Prep: patient was prepped and draped in usual sterile fashion    Local anesthetic:  Topical anesthetic    Details:  Needle Size:  22 G  Approach:  Posterior  Location:  Shoulder  Site:  L subacromial bursa  Medications:  40 mg triamcinolone acetonide 40 mg/mL  Patient tolerance:  Patient tolerated the procedure well with no immediate complications

## 2022-02-02 ENCOUNTER — CLINICAL SUPPORT (OUTPATIENT)
Dept: OTHER | Facility: CLINIC | Age: 37
End: 2022-02-02
Payer: OTHER GOVERNMENT

## 2022-02-02 DIAGNOSIS — Z00.8 ENCOUNTER FOR OTHER GENERAL EXAMINATION: ICD-10-CM

## 2022-02-03 VITALS — BODY MASS INDEX: 28.13 KG/M2 | HEIGHT: 62 IN

## 2022-02-03 LAB
GLUCOSE SERPL-MCNC: 130 MG/DL (ref 60–140)
HDLC SERPL-MCNC: 64 MG/DL
POC CHOLESTEROL, LDL (DOCK): 120 MG/DL
POC CHOLESTEROL, TOTAL: 195 MG/DL
TRIGL SERPL-MCNC: 54 MG/DL

## 2022-05-19 ENCOUNTER — OFFICE VISIT (OUTPATIENT)
Dept: ORTHOPEDICS | Facility: CLINIC | Age: 37
End: 2022-05-19
Payer: OTHER GOVERNMENT

## 2022-05-19 VITALS — WEIGHT: 141.56 LBS | BODY MASS INDEX: 25.89 KG/M2

## 2022-05-19 DIAGNOSIS — G89.29 CHRONIC LEFT SHOULDER PAIN: ICD-10-CM

## 2022-05-19 DIAGNOSIS — M25.512 CHRONIC LEFT SHOULDER PAIN: ICD-10-CM

## 2022-05-19 DIAGNOSIS — M75.42 IMPINGEMENT SYNDROME OF SHOULDER, LEFT: Primary | ICD-10-CM

## 2022-05-19 PROCEDURE — 99213 OFFICE O/P EST LOW 20 MIN: CPT | Mod: S$PBB,,, | Performed by: PHYSICIAN ASSISTANT

## 2022-05-19 PROCEDURE — 99213 PR OFFICE/OUTPT VISIT, EST, LEVL III, 20-29 MIN: ICD-10-PCS | Mod: S$PBB,,, | Performed by: PHYSICIAN ASSISTANT

## 2022-05-19 PROCEDURE — 99213 OFFICE O/P EST LOW 20 MIN: CPT | Mod: PBBFAC | Performed by: PHYSICIAN ASSISTANT

## 2022-05-19 PROCEDURE — 99999 PR PBB SHADOW E&M-EST. PATIENT-LVL III: ICD-10-PCS | Mod: PBBFAC,,, | Performed by: PHYSICIAN ASSISTANT

## 2022-05-19 PROCEDURE — 99999 PR PBB SHADOW E&M-EST. PATIENT-LVL III: CPT | Mod: PBBFAC,,, | Performed by: PHYSICIAN ASSISTANT

## 2022-05-19 NOTE — PROGRESS NOTES
Patient ID: Paige Barros is a 37 y.o. female.    Chief Complaint: Pain of the Left Shoulder      HISTORY:  Paige Barros is a 37 y.o. female who returns to me today for follow up of left shoulder pain.  She was last seen by me 1/7/2022.  Today she is doing well, but notes her pain returned about one month ago.  She had very good relief with the steroid injection.  She has pain with exercising, especially reaching, lifting, overhead activity.  Symptoms initially started about 6 months ago.       PMH/PSH/FamHx/SocHx:    Unchanged from prior visit.    ROS:  Constitution: Negative for chills, fever and weakness.   Respiratory: Negative for cough and shortness of breath.   Musculoskeletal: Positive for left shoulder pain  Psychiatric/Behavioral: The patient is not nervous/anxious.       PHYSICAL EXAM:   Wt 64.2 kg (141 lb 8.6 oz)   BMI 25.89 kg/m²   Left shoulder  Skin intact  No warmth or erythema  TTP lateral acromial  ROM is painful  , ER 50, IR L1  + impingement  + Cornelius's  + lopez  - drop arm    IMAGING: No new imaging.  Previous x-rays reviewed- no acute abnormalities.    ASSESSMENT/PLAN:    Paige was seen today for pain.    Diagnoses and all orders for this visit:    Impingement syndrome of shoulder, left  -     MRI Shoulder Without Contrast Left; Future    Chronic left shoulder pain  -     MRI Shoulder Without Contrast Left; Future    - Left shoulder pain for the past 6 months that has failed to resolved with steroid injection, rest, home exercise program and OTC medications  - Will call with MRI results and discuss further treatment options

## 2022-05-30 ENCOUNTER — HOSPITAL ENCOUNTER (OUTPATIENT)
Dept: RADIOLOGY | Facility: HOSPITAL | Age: 37
Discharge: HOME | End: 2022-05-30
Attending: PHYSICIAN ASSISTANT
Payer: OTHER GOVERNMENT

## 2022-05-30 DIAGNOSIS — G89.29 CHRONIC LEFT SHOULDER PAIN: ICD-10-CM

## 2022-05-30 DIAGNOSIS — M75.42 IMPINGEMENT SYNDROME OF SHOULDER, LEFT: ICD-10-CM

## 2022-05-30 DIAGNOSIS — M25.512 CHRONIC LEFT SHOULDER PAIN: ICD-10-CM

## 2022-05-30 PROCEDURE — 73221 MRI SHOULDER WITHOUT CONTRAST LEFT: ICD-10-PCS | Mod: 26,LT,, | Performed by: RADIOLOGY

## 2022-05-30 PROCEDURE — 73221 MRI JOINT UPR EXTREM W/O DYE: CPT | Mod: 26,LT,, | Performed by: RADIOLOGY

## 2022-05-30 PROCEDURE — 73221 MRI JOINT UPR EXTREM W/O DYE: CPT | Mod: TC,PO,LT

## 2022-05-31 ENCOUNTER — PATIENT MESSAGE (OUTPATIENT)
Dept: ORTHOPEDICS | Facility: CLINIC | Age: 37
End: 2022-05-31
Payer: OTHER GOVERNMENT

## 2022-06-08 ENCOUNTER — OFFICE VISIT (OUTPATIENT)
Dept: ORTHOPEDICS | Facility: CLINIC | Age: 37
End: 2022-06-08
Payer: OTHER GOVERNMENT

## 2022-06-08 VITALS — WEIGHT: 141 LBS | BODY MASS INDEX: 25.95 KG/M2 | HEIGHT: 62 IN

## 2022-06-08 DIAGNOSIS — M25.512 LEFT SHOULDER PAIN, UNSPECIFIED CHRONICITY: Primary | ICD-10-CM

## 2022-06-08 DIAGNOSIS — M75.42 IMPINGEMENT SYNDROME OF SHOULDER, LEFT: Primary | ICD-10-CM

## 2022-06-08 DIAGNOSIS — M25.512 LEFT SHOULDER PAIN, UNSPECIFIED CHRONICITY: ICD-10-CM

## 2022-06-08 PROCEDURE — 99999 PR PBB SHADOW E&M-EST. PATIENT-LVL III: ICD-10-PCS | Mod: PBBFAC,,, | Performed by: ORTHOPAEDIC SURGERY

## 2022-06-08 PROCEDURE — 99213 OFFICE O/P EST LOW 20 MIN: CPT | Mod: PBBFAC,PN | Performed by: ORTHOPAEDIC SURGERY

## 2022-06-08 PROCEDURE — 99203 PR OFFICE/OUTPT VISIT, NEW, LEVL III, 30-44 MIN: ICD-10-PCS | Mod: S$PBB,,, | Performed by: ORTHOPAEDIC SURGERY

## 2022-06-08 PROCEDURE — 99999 PR PBB SHADOW E&M-EST. PATIENT-LVL III: CPT | Mod: PBBFAC,,, | Performed by: ORTHOPAEDIC SURGERY

## 2022-06-08 PROCEDURE — 99203 OFFICE O/P NEW LOW 30 MIN: CPT | Mod: S$PBB,,, | Performed by: ORTHOPAEDIC SURGERY

## 2022-06-08 NOTE — PROGRESS NOTES
29 years old left shoulder pain for about 8 months time no trauma 6 on good days 7 on bad days for to have had injections the past with partial relief.  Difficulty with overactivity.  Patient reports have a history of what sounds like a subacromial decompression on the right shoulder that she responded favorably to    Exam shows positive Neer Hitchcock impingement sign cuff strength intact but painful    Imaging reveals AC arthrosis, partial rotator cuff tear    Assessment:  Impingement left shoulder AC arthrosis partial rotator cuff tear    Plan:  Physical therapy, follow up as needed    Imaging studies ordered and reviewed by me    Further History  Aching pain  Worse with activity  Relieved with rest  No other associated symptoms  No other radiation    Further Exam  Alert and oriented  Pleasant  Contralateral limb has appropriate range of motion for age and condition  Contralateral limb has appropriate strength for age and condition  Contralateral limb has appropriate stability  for age and condition  No adenopathy  Pulses are appropriate for current condition  Skin is intact        Chief Complaint    Chief Complaint   Patient presents with    Left Shoulder - Pain       HPI  Paige Barros is a 37 y.o.  female who presents with       Past Medical History  Past Medical History:   Diagnosis Date    Migraine aura without headache     Migraine headache     TMJ arthritis        Past Surgical History  Past Surgical History:   Procedure Laterality Date    BREAST SURGERY  2008    augmentation       Medications  Current Outpatient Medications   Medication Sig    azelastine (ASTELIN) 137 mcg (0.1 %) nasal spray 1 spray (137 mcg total) by Nasal route 2 (two) times daily. (Patient taking differently: 1 spray by Nasal route 2 (two) times daily.)    eletriptan (RELPAX) 40 MG tablet Take 1 tablet (40 mg total) by mouth as needed. may repeat in 2 hours if necessary    levonorgestrel (MIRENA) 20 mcg/24 hr (5 years) IUD  1 each by Intrauterine route once.    meloxicam (MOBIC) 15 MG tablet Take 1 tablet (15 mg total) by mouth once daily.     No current facility-administered medications for this visit.       Allergies  Review of patient's allergies indicates:  No Known Allergies    Family History  Family History   Problem Relation Age of Onset    Hypertension Father     Cancer Maternal Grandfather         bone ca    Hypertension Paternal Grandmother     Heart disease Paternal Grandfather         MI    No Known Problems Mother        Social History  Social History     Socioeconomic History    Marital status:    Tobacco Use    Smoking status: Former Smoker     Quit date: 8/1/2011     Years since quitting: 10.8    Smokeless tobacco: Never Used    Tobacco comment: smoked ~18yo to 26 yo   Substance and Sexual Activity    Alcohol use: Yes     Comment: Occasional, never heavy drinker    Drug use: No    Sexual activity: Yes     Birth control/protection: I.U.D.     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                Review of Systems     Constitutional: Negative    HENT: Negative  Eyes: Negative  Respiratory: Negative  Cardiovascular: Negative  Musculoskeletal: HPI  Skin: Negative  Neurological: Negative  Hematological: Negative  Endocrine: Negative                 Physical Exam    There were no vitals filed for this visit.  Body mass index is 25.79 kg/m².  Physical Examination:     General appearance -  well appearing, and in no distress  Mental status - awake  Neck - supple  Chest -  symmetric air entry  Heart - normal rate   Abdomen - soft      Assessment     1. Left shoulder pain, unspecified chronicity          Plan

## 2022-07-05 ENCOUNTER — CLINICAL SUPPORT (OUTPATIENT)
Dept: REHABILITATION | Facility: HOSPITAL | Age: 37
End: 2022-07-05
Attending: ORTHOPAEDIC SURGERY
Payer: OTHER GOVERNMENT

## 2022-07-05 DIAGNOSIS — M75.42 IMPINGEMENT SYNDROME OF SHOULDER, LEFT: ICD-10-CM

## 2022-07-05 DIAGNOSIS — M25.69 BACK STIFFNESS: ICD-10-CM

## 2022-07-05 DIAGNOSIS — M25.512 LEFT SHOULDER PAIN, UNSPECIFIED CHRONICITY: ICD-10-CM

## 2022-07-05 DIAGNOSIS — R29.898 SHOULDER WEAKNESS: Primary | ICD-10-CM

## 2022-07-05 PROCEDURE — 97161 PT EVAL LOW COMPLEX 20 MIN: CPT | Mod: PO

## 2022-07-05 PROCEDURE — 97110 THERAPEUTIC EXERCISES: CPT | Mod: PO

## 2022-07-05 NOTE — PLAN OF CARE
[facility]   Physical Therapy Initial Evaluation     Date: 7/5/2022   Name: Paige Barros  Clinic Number: [id number]    Therapy Diagnosis:   Encounter Diagnoses   Name Primary?    Impingement syndrome of shoulder, left     Left shoulder pain, unspecified chronicity     Shoulder weakness Yes    Back stiffness      Physician: Terry Chandler MD    Physician Orders: PT Eval and Treat   Medical Diagnosis from Referral: Impingement syndrome of shoulder, left [M75.42], Left shoulder pain, unspecified chronicity [M25.512]  Evaluation Date: 7/5/2022  Authorization Period Expiration: 10/21/2022  Plan of Care Expiration: 9/27/2022  Progress Note Due: 9/27/2022  Visit # / Visits authorized: 1/ 1   FOTO: 1/3    Precautions: Standard     Time In: 10:00 am  Time Out: 11:00 am  Total Appointment Time (timed & untimed codes): 60 minutes      SUBJECTIVE     Date of onset: May 2022    History of current condition - Paige reports: late last year she started to have pain in her shoulder. She was likely going heavier and trying to do more pull-ups, heavier strength training. She had a steroid shot in January, which wore off in May. She has a torn RC tendon, bone spurs. She is weighing her options. She had similar issues with the other side with bone spurs, and had a SAD to treat. It took a long time to get strength back but notes that she doesn't really have any issues with that now. She has pain with certain movements. She still litts and is active but modifies the amount of weight. It got to the point where she has difficulty with ADLs, pulling the covers in bed, turning the steering wheel. PMH cervical radiculopathy on left. Chest press, clean and press, flys all bother her. [facility]     Falls: 0    Imaging, MRI studies: 1. Fluid signal intensity surrounds the infraspinatus tendon in particular suggestive of muscular strain with tendinopathy.  Partial-thickness tearing or  fraying may be present distally.  Some distal insertional thinning or partial-thickness tearing may be present.  A tiny penetration at the junction of the infraspinatus and supraspinatus tendons a tendon is questioned as well as in the mid infraspinatus but these both represent small linear regions of signal abnormality.  2. Under surface partial-thickness signal abnormality of the supraspinatus tendon with some possible expansion with signal abnormality greater than 50% of the tendon thickness.  This is suggestive of tendinopathy, partial thickness tearing can appear similar  3. 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  Prior Level of Function: independent, active  Current Level of Function: limits lifting, difficulty with ADLs    Pain:  Current 0/10, worst 7/10, best 0/10   Location: left lateral shoulder, subacromion, pain to elbow  Description: Aching, Dull, Sharp and Shooting  Aggravating Factors: abduction, abduction + ER, overhead  Easing Factors: steroid injection for 5 months, anti-inflammatory didn't seem to help    Patients goals: get back to lifting, no issues with ADLs     Medical History:   Past Medical History:   Diagnosis Date    Migraine aura without headache     Migraine headache     TMJ arthritis        Surgical History:   Paige Barros  has a past surgical history that includes Breast surgery (2008).    Medications:   Paige has a current medication list which includes the following prescription(s): azelastine, eletriptan, levonorgestrel, and meloxicam.    Allergies:   Review of patient's allergies indicates:  No Known Allergies       OBJECTIVE     Posture: anterior humeral head positioning    Functional mobility:   Shoulder Elevation: decreased scapular posterior tilt      Shoulder Active Range of Motion:   Shoulder Right Left   Flexion   170 170*   ER at 0   80 70*   Behind the back Reach   T10 L5*   Scapula Upward Rotation T2 C5*      Shoulder Passive Range of Motion:   Shoulder Right Left   Flexion   180 175   ER at 90   95 90   IR at 90   75 55     Cervical Range of Motion:    % Observation Pain   Flexion 75 Hinge C6 -     Extension 75 Hinge C6 -     Right Rotation 75  -     Left Rotation 75  -     Right Sidebend 50  -     Left Sidebend 50  -       Seated Thoracolumbar Range of Motion:    % Observation Pain   Flexion 75  -   Extension 25 Unable to get UE in position -   Right Rotation 50  -   Left Rotation 50  -   Right Sidebend 50  -   Left Sidebend 50  -       Strength:   Right Left   Flexion  4+/5 3+/5   Abduction 4+/5 3+/5   Scaption 4+/5 3+/5   Shoulder ER at side 5/5 4/5   Shoulder ER at 90-90 4/5 3/5   Shoulder IR at side  5/5 4+/5   Shoulder IR at 90-90 4/5 3-/5   Scapula Upward rotation force couple- UT/LT/SA (determined due to inability to reach full ROM) 3/5 2/5   Middle trap 3/5 trace   Lower trap 3/5 trace       Special Tests:    Ligamentous Stability    Sharp-Ash -   Lateral Flexion Alar Ligament -     Distraction -   Compression -   Anterior Quadrant -   Posterior Quadrant -   Spurlings -   1st rib spring test -   Upper Cervical Flexion Test -      Right Left   Hitchcock- Melvin - +   Neer's - +   Full Can - +   Posterior/Internal Impingement Sign - +   Supine Impingement  - -      Right Left   Load & Shift - -   Sulcus Sign - -   Apprehension Test - -   Relocation Test  - -      Right Left   Drop Arm Test - -   ER Lag Sign - -   Bear Hug - -   Belly Press  - -      Right Left   Active Compression - -   Cross Body Adduction - -       Joint Mobility: decreased posterior humeral glide left     Palpation: tender to palpation at left supraspinatus tendon    Flexibility:   Post Cuff: R - ; L -   Lat: R - ; L -   Pec Minor: R - ; L -    ULTT:    Median: -   Ulnar:  "-   Radial: + with shoulder extension      Limitation/Restriction for FOTO shoulder Survey    Therapist reviewed FOTO scores for Paige Barros on 7/5/2022.   FOTO documents entered into [employer] - see Media section.    Limitation Score: 56%         TREATMENT     Total Treatment time (time-based codes) separate from Evaluation: 15 minutes      Paige received the treatments listed below:      therapeutic exercises to develop strength, endurance, ROM and flexibility for 15 minutes including:  Hand heel rocking 10x5"  Quadruped thoracic rotation x10 ea  IR walkouts green theraband x10       PATIENT EDUCATION AND HOME EXERCISES     Education provided:   - pathophysiology, expectations    Written Home Exercises Provided: yes. Exercises were reviewed and Paige was able to demonstrate them prior to the end of the session.  Paige demonstrated good  understanding of the education provided. See EMR under Patient Instructions for exercises provided during therapy sessions.    ASSESSMENT     Paige is a 37 y.o. female referred to outpatient Physical Therapy with a medical diagnosis of Impingement syndrome of shoulder, left. Patient presents with decreased glenohumeral mobility, decreased thoracic mobility, decreased scapular and cuff control, and decreased tolerance for functional mobility. She would benefit from skilled PT services to improve kinetic chain mobility and strength to offload muscular and neural tissues and progressively load to return to her prior level of function.     Patient prognosis is Good.   Patient will benefit from skilled outpatient Physical Therapy to address the deficits stated above and in the chart below, provide patient /family education, and to maximize patientt's level of independence.     Plan of care discussed with patient: Yes  Patient's spiritual, cultural and educational needs considered and patient is agreeable to the plan of care and goals as stated below:     Anticipated Barriers for " therapy: none anticipated    Medical Necessity is demonstrated by the following  History  Co-morbidities and personal factors that may impact the plan of care Co-morbidities:   migraine headache    Personal Factors:   no deficits     low   Examination  Body Structures and Functions, activity limitations and participation restrictions that may impact the plan of care Body Regions:   neck  upper extremities    Body Systems:    gross symmetry  ROM  strength  gross coordinated movement  transfers  transitions  motor control  motor learning    Participation Restrictions:   Difficulty with ADLs, recreation    Activity limitations:   Learning and applying knowledge  no deficits    General Tasks and Commands  no deficits    Communication  no deficits    Mobility  lifting and carrying objects  fine hand use (grasping/picking up)  driving (bike, car, motorcycle)    Self care  washing oneself (bathing, drying, washing hands)  caring for body parts (brushing teeth, shaving, grooming)    Domestic Life  shopping  cooking  doing house work (cleaning house, washing dishes, laundry)    Interactions/Relationships  family relationships    Life Areas  employment    Community and Social Life  community life  recreation and leisure         moderate   Clinical Presentation evolving clinical presentation with changing clinical characteristics moderate   Decision Making/ Complexity Score: low     Goals:  Short Term Goals: 6 weeks   - pt will demonstrate full nonpainful shoulder AROM for decreased tissue irritability  - pt will demonstrate negative radial neural tension to decrease neural involvement  - pt will report no pain with ADLs for improved quality of life    Long Term Goals: 12 weeks   - pt will demonstrate at least 95% LSI with HHD for flexion, abduction, ER, and IR for improved stability  - pt will demonstrate at least 95% LSI with UE Y-balance for improved postural control  - pt will pass push-up and pull-up testing to  demonstrate normalized endurance  - pt will be able to perform lifting activities with appropriate mechanics and no pain      PLAN   Plan of care Certification: 7/5/2022 to 9/27/2022.    Outpatient Physical Therapy 1 times weekly for 12 weeks to include the following interventions: Manual Therapy, Moist Heat/ Ice, Neuromuscular Re-ed, Patient Education, Therapeutic Activities and Therapeutic Exercise.     Ada Saldivar, PT      I CERTIFY THE NEED FOR THESE SERVICES FURNISHED UNDER THIS PLAN OF TREATMENT AND WHILE UNDER MY CARE   Physician's comments:     Physician's Signature: ___________________________________________________

## 2022-07-13 ENCOUNTER — CLINICAL SUPPORT (OUTPATIENT)
Dept: REHABILITATION | Facility: HOSPITAL | Age: 37
End: 2022-07-13
Attending: ORTHOPAEDIC SURGERY
Payer: OTHER GOVERNMENT

## 2022-07-13 DIAGNOSIS — M25.69 BACK STIFFNESS: Primary | ICD-10-CM

## 2022-07-13 PROCEDURE — 97110 THERAPEUTIC EXERCISES: CPT | Mod: PO

## 2022-07-13 PROCEDURE — 97530 THERAPEUTIC ACTIVITIES: CPT | Mod: PO

## 2022-07-13 NOTE — PROGRESS NOTES
Physical Therapy Daily Treatment Note     Name: Paige Barros  Clinic Number: [id number]    Therapy Diagnosis:   Encounter Diagnosis   Name Primary?    Back stiffness Yes     Physician: Terry Chandler MD    Visit Date: 7/13/2022    Physician Orders: PT Eval and Treat   Medical Diagnosis from Referral: Impingement syndrome of shoulder, left [M75.42], Left shoulder pain, unspecified chronicity [M25.512]  Evaluation Date: 7/5/2022  Authorization Period Expiration: 10/21/2022  Plan of Care Expiration: 9/27/2022  Progress Note Due: 9/27/2022  Visit # / Visits authorized: 1/ 1     FOTO 1st: 43%  FOTO 5th:  FOTO 10th:     Time In: 0700  Time Out: 0800  Total Billable Time: 56 minutes    Precautions: Standard    Subjective     Pt reports: that she was in great pain the day after initial eval and have trouble pulling up her pants and driving turning the steering wheel away from the body .  She was compliant with home exercise program.  Response to previous treatment: soreness  Functional change: progressing    Pain: 2/10  Location: left shoulder      Objective     Shoulder Active Range of Motion:   Shoulder Right Left   Flexion    170 170*   ER at 0   ABD Left  115 Right 165  80 70*   Behind the back Reach    T10 L5*   Scapula Upward Rotation T2 C5*          Paige received therapeutic exercises to develop for 9 minutes including:  Supine serratus punch 2x 10 3#  Quadruped serratus push up x10 10''  Serratus rock back 2x 10 5''    Paige received the following manual therapy techniques:  for 6 minutes, including  PROM IR/ER  Post Gh jt mob Gr2-4   iso metric IR/ER at scaption plane 2x 5 5'' 10% effort     Paige participated in neuromuscular re-education activities to improve:  for 00 minutes. The following activities were included:      Paige participated in dynamic functional therapeutic activities to improve functional performance for 41  minutes, including:  Pt edu on activity limitation, activity modification  in the gym, safe exercises she can complete in the gym  and [facility] biomechanics   Staggered stance standing rows 2x10 7#  Standing hip hinge stance shoulder ext 2x 10 7#  Standing Ts Yellow Theraband 3x 10 Yellow Theraband   Shoulder taps with serratus push up hold 2x 10     Home Exercises Provided and Patient Education Provided     Education provided:   - pt edu on activity modification in the gym     Written Home Exercises Provided: Patient instructed to cont prior HEP.  Exercises were reviewed and Paige was able to demonstrate them prior to the end of the session.  Paige demonstrated good  understanding of the education provided.     See EMR under Patient Instructions for exercises provided prior visit.    Assessment     Pt presents with 2/10 pain in shoulder and reports of soreness after her last treatment. Treatment focuses on promoting scapular stability during movement patterns to off load RTC and improve functional activity. Pt tolerated this well with complaints of pain during shoulder taps that improves with cueing for serratus activation.      Paige Is progressing well towards her goals.   Pt prognosis is Excellent.     Pt will continue to benefit from skilled outpatient physical therapy to address the deficits listed in the problem list box on initial evaluation, provide pt/family education and to maximize pt's level of independence in the home and community environment.     Pt's spiritual, cultural and educational needs considered and pt agreeable to plan of care and goals.    Anticipated barriers to physical therapy: none       Goals:  Short Term Goals: 6 weeks   - pt will demonstrate full nonpainful shoulder AROM for decreased tissue irritability  - pt will demonstrate negative radial neural tension to decrease neural involvement  - pt will report no pain with ADLs for improved quality of life     Long Term Goals: 12 weeks   - pt will demonstrate at least 95% LSI with HHD for flexion, abduction, ER,  and IR for improved stability  - pt will demonstrate at least 95% LSI with UE Y-balance for improved postural control  - pt will pass push-up and pull-up testing to demonstrate normalized endurance  - pt will be able to perform lifting activities with appropriate mechanics and no pain    Plan     Improved Left GH jt mobility, thoracic mobility, along with strengthening scapular and RTC musculature to promote normal scapulo humeral rythym to support functional patterning    Kassandra Coronado, PT, DPT

## 2022-07-20 ENCOUNTER — CLINICAL SUPPORT (OUTPATIENT)
Dept: REHABILITATION | Facility: HOSPITAL | Age: 37
End: 2022-07-20
Attending: ORTHOPAEDIC SURGERY
Payer: OTHER GOVERNMENT

## 2022-07-20 DIAGNOSIS — M25.69 BACK STIFFNESS: Primary | ICD-10-CM

## 2022-07-20 PROCEDURE — 97110 THERAPEUTIC EXERCISES: CPT | Mod: PO

## 2022-07-20 PROCEDURE — 97140 MANUAL THERAPY 1/> REGIONS: CPT | Mod: PO

## 2022-07-20 PROCEDURE — 97112 NEUROMUSCULAR REEDUCATION: CPT | Mod: PO

## 2022-07-20 NOTE — PROGRESS NOTES
Physical Therapy Daily Treatment Note     Name: Paige Barros  Clinic Number: [id number]    Therapy Diagnosis:   Encounter Diagnosis   Name Primary?    Back stiffness Yes     Physician: Terry Chandler MD    Visit Date: 7/20/2022    Physician Orders: PT Eval and Treat   Medical Diagnosis from Referral: Impingement syndrome of shoulder, left [M75.42], Left shoulder pain, unspecified chronicity [M25.512]  Evaluation Date: 7/5/2022  Authorization Period Expiration: 10/21/2022  Plan of Care Expiration: 9/27/2022  Progress Note Due: 9/27/2022  Visit # / Visits authorized: 2/ 15     FOTO 1st: 43%  FOTO 5th:  FOTO 10th:     Time In: 0905  Time Out: 0955  Total Billable Time: 50 minutes    Precautions: Standard    Subjective     Pt reports: she had some soreness again after last visit, but has been able to do her exercises a few times since.   She was compliant with home exercise program.  Response to previous treatment: soreness  Functional change: progressing    Pain: 2/10  Location: left shoulder      Objective     Shoulder Active Range of Motion:   Shoulder Right Left   Flexion    170 170*   ER at 0   ABD Left  115 Right 165  80 70*   Behind the back Reach    T10 L5*   Scapula Upward Rotation T2 C5*          Paige received therapeutic exercises to develop for 15 minutes including:  Supine IR isometrics, isotonics x10 ea  Serratus rock back 2x 10 5''  Unilateral landmine press for mobility 2x8    Paige received the following manual therapy techniques:  for 10 minutes, including  PROM IR/ER  Post, inf Gh jt mob Gr2-4     Paige participated in neuromuscular re-education activities to improve:  for 00 minutes. The following activities were included:      Paige participated in dynamic functional therapeutic activities to improve functional performance for 25 minutes, including:    Wall slide Ys for lower trap 2x10  Wall ball alphabet x2  Landmine double arm 3x6 35-45#    Home Exercises Provided and Patient  Education Provided     Education provided:   - pt edu on activity modification in the gym     Written Home Exercises Provided: Patient instructed to cont prior HEP.  Exercises were reviewed and Paige was able to demonstrate them prior to the end of the session.  Paige demonstrated good  understanding of the education provided.     See EMR under Patient Instructions for exercises provided prior visit.    Assessment     Heavy focus on capsular mobility to decrease anterior and superior humeral shearing, followed by active stability from subscap. Integrated increased scapular upward rotation exercises to improve subacromial space with good tolerance. Improved coordination and control with wall slide Ys to improve kinetic chain activation. Will continue to progress as tolerated to offload painful tissues and safely return to prior level of activity.       Paige Is progressing well towards her goals.   Pt prognosis is Excellent.     Pt will continue to benefit from skilled outpatient physical therapy to address the deficits listed in the problem list box on initial evaluation, provide pt/family education and to maximize pt's level of independence in the home and community environment.     Pt's spiritual, cultural and educational needs considered and pt agreeable to plan of care and goals.    Anticipated barriers to physical therapy: none       Goals:  Short Term Goals: 6 weeks   - pt will demonstrate full nonpainful shoulder AROM for decreased tissue irritability  - pt will demonstrate negative radial neural tension to decrease neural involvement  - pt will report no pain with ADLs for improved quality of life     Long Term Goals: 12 weeks   - pt will demonstrate at least 95% LSI with HHD for flexion, abduction, ER, and IR for improved stability  - pt will demonstrate at least 95% LSI with UE Y-balance for improved postural control  - pt will pass push-up and pull-up testing to demonstrate normalized endurance  - pt  will be able to perform lifting activities with appropriate mechanics and no pain    Plan     Improved Left GH jt mobility, thoracic mobility, along with strengthening scapular and RTC musculature to promote normal scapulo humeral rythym to support functional patterning    Kassandra Coronado, PT, DPT

## 2022-07-21 ENCOUNTER — OFFICE VISIT (OUTPATIENT)
Dept: FAMILY MEDICINE | Facility: CLINIC | Age: 37
End: 2022-07-21
Payer: OTHER GOVERNMENT

## 2022-07-21 DIAGNOSIS — U07.1 COVID-19: ICD-10-CM

## 2022-07-21 DIAGNOSIS — B34.9 VIRAL ILLNESS: Primary | ICD-10-CM

## 2022-07-21 LAB
CTP QC/QA: YES
SARS-COV-2 RDRP RESP QL NAA+PROBE: POSITIVE

## 2022-07-21 PROCEDURE — 99213 PR OFFICE/OUTPT VISIT, EST, LEVL III, 20-29 MIN: ICD-10-PCS | Mod: 95,,, | Performed by: PHYSICIAN ASSISTANT

## 2022-07-21 PROCEDURE — 99213 OFFICE O/P EST LOW 20 MIN: CPT | Mod: 95,,, | Performed by: PHYSICIAN ASSISTANT

## 2022-07-21 NOTE — PROGRESS NOTES
The patient location is: [place]  The chief complaint leading to consultation is: cough, congestion, fever x 2 days  The patient location is: [place]  The chief complaint leading to consultation is: cough, congestion, fever x 2 days    Visit type: audiovisual    Face to Face time with patient: 15 min  25 minutes of total time spent on the encounter, which includes face to face time and non-face to face time preparing to see the patient (eg, review of tests), Obtaining and/or reviewing separately obtained history, Documenting clinical information in the electronic or other health record, Independently interpreting results (not separately reported) and communicating results to the patient/family/caregiver, or Care coordination (not separately reported).         Each patient to whom he or she provides medical services by telemedicine is:  (1) informed of the relationship between the physician and patient and the respective role of any other health care provider with respect to management of the patient; and (2) notified that he or she may decline to receive medical services by telemedicine and may withdraw from such care at any time.    Notes:     Diagnoses and all orders for this visit:    Viral illness  -     POCT COVID-19 Rapid Screening; Future  -     POCT COVID-19 Rapid Screening  -     nirmatrelvir-ritonavir 300 mg (150 mg x 2)-100 mg copackaged tablets (EUA); Take 3 tablets by mouth 2 (two) times daily for 5 days. Each dose contains 2 nirmatrelvir (pink tablets) and 1 ritonavir (white tablet). Take all 3 tablets together    COVID-19  -     nirmatrelvir-ritonavir 300 mg (150 mg x 2)-100 mg copackaged tablets (EUA); Take 3 tablets by mouth 2 (two) times daily for 5 days. Each dose contains 2 nirmatrelvir (pink tablets) and 1 ritonavir (white tablet). Take all 3 tablets together    discussed otc's  Vaporizer  Hydrate  Isolate x 5 days     covid test  positive                                                                                      Daughter has covid  2 neg covid tests  covid immunized x 2     Visit type: audiovisual    Face to Face time with patient: 15 min  25 minutes of total time spent on the encounter, which includes face to face time and non-face to face time preparing to see the patient (eg, review of tests), Obtaining and/or reviewing separately obtained history, Documenting clinical information in the electronic or other health record, Independently interpreting results (not separately reported) and communicating results to the patient/family/caregiver, or Care coordination (not separately reported).         Each patient to whom he or she provides medical services by telemedicine is:  (1) informed of the relationship between the physician and patient and the respective role of any other health care provider with respect to management of the patient; and (2) notified that he or she may decline to receive medical services by telemedicine and may withdraw from such care at any time.    Notes:   Answers for HPI/ROS submitted by the patient on 7/21/2022  activity change: No  unexpected weight change: No  neck pain: No  hearing loss: No  rhinorrhea: Yes  trouble swallowing: No  eye discharge: Yes  visual disturbance: No  chest tightness: No  wheezing: No  chest pain: No  palpitations: No  blood in stool: No  constipation: No  vomiting: No  diarrhea: No  polydipsia: No  polyuria: No  difficulty urinating: No  hematuria: No  menstrual problem: No  dysuria: No  joint swelling: No  arthralgias: No  headaches: Yes  weakness: Yes  confusion: No  dysphoric mood: No    Diagnoses and all orders for this visit:    Viral illness  -     POCT COVID-19 Rapid Screening; Future  -     POCT COVID-19 Rapid Screening  -     nirmatrelvir-ritonavir 300 mg (150 mg x 2)-100 mg copackaged tablets (EUA); Take 3 tablets by mouth 2 (two) times daily for 5 days. Each dose  contains 2 nirmatrelvir (pink tablets) and 1 ritonavir (white tablet). Take all 3 tablets together    COVID-19  -     nirmatrelvir-ritonavir 300 mg (150 mg x 2)-100 mg copackaged tablets (EUA); Take 3 tablets by mouth 2 (two) times daily for 5 days. Each dose contains 2 nirmatrelvir (pink tablets) and 1 ritonavir (white tablet). Take all 3 tablets together

## 2022-07-27 ENCOUNTER — CLINICAL SUPPORT (OUTPATIENT)
Dept: REHABILITATION | Facility: HOSPITAL | Age: 37
End: 2022-07-27
Attending: ORTHOPAEDIC SURGERY
Payer: OTHER GOVERNMENT

## 2022-07-27 DIAGNOSIS — M25.69 BACK STIFFNESS: Primary | ICD-10-CM

## 2022-07-27 PROCEDURE — 97140 MANUAL THERAPY 1/> REGIONS: CPT | Mod: PO

## 2022-07-27 PROCEDURE — 97110 THERAPEUTIC EXERCISES: CPT | Mod: PO

## 2022-07-27 PROCEDURE — 97530 THERAPEUTIC ACTIVITIES: CPT | Mod: PO

## 2022-07-27 NOTE — PROGRESS NOTES
"  Physical Therapy Daily Treatment Note     Name: Paige Barros  Clinic Number: [id number]    Therapy Diagnosis:   Encounter Diagnosis   Name Primary?    Back stiffness Yes     Physician: Terry Chandler MD    Visit Date: 7/27/2022    Physician Orders: PT Eval and Treat   Medical Diagnosis from Referral: Impingement syndrome of shoulder, left [M75.42], Left shoulder pain, unspecified chronicity [M25.512]  Evaluation Date: 7/5/2022  Authorization Period Expiration: 10/21/2022  Plan of Care Expiration: 9/27/2022  Progress Note Due: 9/27/2022  Visit # / Visits authorized: 3/ 15     FOTO 1st: 43%  FOTO 5th:  FOTO 10th:     Time In: 0900  Time Out: 1000  Total Billable Time: 45 minutes    Precautions: Standard    Subjective     Pt reports: that she has to leave early bc of a dental appt down the street. States she overall feels like she is getting better. States that she is having  tension/pain/tightness "pinch" along with posterior aspect of her shoulder and down into posterior lateral Left upper arm.  States she went to the gym this morning completing LE exercise. States she has been doing her HEP.     She was compliant with home exercise program.  Response to previous treatment: soreness  Functional change: progressing    Pain: 2/10  Location: left shoulder      Objective   7/27/22  Shoulder Active Range of Motion:   Shoulder Right Left   Flexion    170 170 " tightness"    ER at 0   ABD Left  115 Right 165  80 70*   Behind the back Reach    T10 T9   Scapula Upward Rotation T2 T1*          Paige received therapeutic exercises to develop for 9 minutes including:  Serratus rock back 2x 10 5''  scap push up at bench 3x 15    Not today  Supine IR isometrics, isotonics x10 ea  Unilateral landmine press for mobility 2x8    Paige received the following manual therapy techniques:  for 14 minutes, including  Contract real of Lats ADD,IR, Ext   PROM IR/ER  Post, inf Gh jt mob Gr2-4   Manual serratus activation " "misti    Paige participated in neuromuscular re-education activities to improve:  for 00 minutes. The following activities were included:      Paige participated in dynamic functional therapeutic activities to improve functional performance for 21 minutes, including:  Standing Ys chest ht band YTB 3x 10   Standing serratus punch x5 (test-re-test after scap PU and manual serratus activation)> no decrease in pain  Standing IR 3x 10       Not today  Wall slide Ys for lower trap 2x10  Wall ball alphabet x2  Landmine double arm 3x6 35-45#    Home Exercises Provided and Patient Education Provided     Education provided:   - pt edu on activity modification in the gym     Written Home Exercises Provided: Patient instructed to cont prior HEP.  Exercises were reviewed and Paige was able to demonstrate them prior to the end of the session.  Paige demonstrated good  understanding of the education provided.     See EMR under Patient Instructions for exercises provided prior visit.    Assessment   Pt presents with pinch in posterior shoulder, (+) painful arch and feeling of "stretch" along the post lateral aspect of Left shoulder with AROM flexion ABD. Treatment focuses on improve scapular positioning with cueing from PT and appropriate scapular stabilizers. Pt had pain with standing serratus punch that does not decrease with cueing or manual serratus activities, d/c and regressed to wt. Bench scap push ups. Pt tolerated standing IR well with no issues.       Paige Is progressing well towards her goals.   Pt prognosis is Excellent.     Pt will continue to benefit from skilled outpatient physical therapy to address the deficits listed in the problem list box on initial evaluation, provide pt/family education and to maximize pt's level of independence in the home and community environment.     Pt's spiritual, cultural and educational needs considered and pt agreeable to plan of care and goals.    Anticipated barriers to " physical therapy: none       Goals:  Short Term Goals: 6 weeks   - pt will demonstrate full nonpainful shoulder AROM for decreased tissue irritability  - pt will demonstrate negative radial neural tension to decrease neural involvement  - pt will report no pain with ADLs for improved quality of life     Long Term Goals: 12 weeks   - pt will demonstrate at least 95% LSI with HHD for flexion, abduction, ER, and IR for improved stability  - pt will demonstrate at least 95% LSI with UE Y-balance for improved postural control  - pt will pass push-up and pull-up testing to demonstrate normalized endurance  - pt will be able to perform lifting activities with appropriate mechanics and no pain    Plan     Improved Left GH jt mobility, thoracic mobility, along with strengthening scapular and RTC musculature to promote normal scapulo humeral rythym to support functional patterning    Kassandra Coronado, PT, DPT

## 2022-08-17 ENCOUNTER — CLINICAL SUPPORT (OUTPATIENT)
Dept: REHABILITATION | Facility: HOSPITAL | Age: 37
End: 2022-08-17
Attending: ORTHOPAEDIC SURGERY
Payer: OTHER GOVERNMENT

## 2022-08-17 DIAGNOSIS — M25.69 BACK STIFFNESS: Primary | ICD-10-CM

## 2022-08-17 PROCEDURE — 97140 MANUAL THERAPY 1/> REGIONS: CPT | Mod: PO

## 2022-08-17 PROCEDURE — 97110 THERAPEUTIC EXERCISES: CPT | Mod: PO

## 2022-08-17 PROCEDURE — 97530 THERAPEUTIC ACTIVITIES: CPT | Mod: PO

## 2022-08-17 NOTE — PROGRESS NOTES
"  Physical Therapy Daily Treatment Note     Name: Paige Barros  Clinic Number: [id number]    Therapy Diagnosis:   Encounter Diagnosis   Name Primary?    Back stiffness Yes     Physician: Terry Chandler MD    Visit Date: 8/17/2022    Physician Orders: PT Eval and Treat   Medical Diagnosis from Referral: Impingement syndrome of shoulder, left [M75.42], Left shoulder pain, unspecified chronicity [M25.512]  Evaluation Date: 7/5/2022  Authorization Period Expiration: 10/21/2022  Plan of Care Expiration: 9/27/2022  Progress Note Due: 9/27/2022  Visit # / Visits authorized: 3/ 15     FOTO 1st: 43%  FOTO 5th:  FOTO 10th:     Time In: 0900  Time Out: 1000  Total Billable Time: 57 minutes    Precautions: Standard    Subjective     Pt reports: that she has had less pain. States only has been when checking time on her watch, pulling covers up, and pulling pants up to get dressed. States her workouts have been good otherwise.    She was compliant with home exercise program.  Response to previous treatment: soreness  Functional change: progressing    Pain: 2/10  Location: left shoulder      Objective   8/17/22  Shoulder Active Range of Motion:   Shoulder Right Left   Flexion    170 170 " tightness"     115   IR at 45 deg ABD 80 65 p! At inside of arm    ER at 45 deg ABD 85 90   Behind the back Reach    T10 T9   Scapula Upward Rotation T2 T1*          Paige received therapeutic exercises to develop for 26 minutes including:  Pt edu on activity modification for ADLs and exercise  Serratus rock back 2x 10 5''  scap push up at bench 3x 10      Not today      Paige received the following manual therapy techniques:  for 14 minutes, including  PROM IR/ER  Post, inf Gh jt mob Gr2-4   Manual serratus activation isos 6x 10''     Not today  Contract real of Lats ADD,IR, Ext     Paige participated in neuromuscular re-education activities to improve:  for 00 minutes. The following activities were included:      Paige " participated in dynamic functional therapeutic activities to improve functional performance for 17 minutes, including:  Standing Ys chest ht band RTB 3x 10   Standing Ts Red Therband 3x 10   OH press 3x8  5-8#  Bench Press 3x8 45# bar     Not today    Home Exercises Provided and Patient Education Provided     Education provided:   - pt edu on activity modification in the gym     Written Home Exercises Provided: Patient instructed to cont prior HEP.  Exercises were reviewed and Paige was able to demonstrate them prior to the end of the session.  Paige demonstrated good  understanding of the education provided.     See EMR under Patient Instructions for exercises provided prior visit.    Assessment   Pt presents with improvement to activity load in exercise and ADLs. Pt continues to have pain with getting dressed and check the time on her watch on her LUE. Pt edu on activation modification and scapular stability during this task to increase space in sub-acromial space to take stress off of RTC impingement moments. Pt understands. Treatment continued with pt edu on HEP, activity modification and scapular stability. Pt had a (+) painful arc with a (-) painful arc after serratus activation. Continue with POC      Paige Is progressing well towards her goals.   Pt prognosis is Excellent.     Pt will continue to benefit from skilled outpatient physical therapy to address the deficits listed in the problem list box on initial evaluation, provide pt/family education and to maximize pt's level of independence in the home and community environment.     Pt's spiritual, cultural and educational needs considered and pt agreeable to plan of care and goals.    Anticipated barriers to physical therapy: none       Goals:  Short Term Goals: 6 weeks   - pt will demonstrate full nonpainful shoulder AROM for decreased tissue irritability  - pt will demonstrate negative radial neural tension to decrease neural involvement  - pt will  report no pain with ADLs for improved quality of life     Long Term Goals: 12 weeks   - pt will demonstrate at least 95% LSI with HHD for flexion, abduction, ER, and IR for improved stability  - pt will demonstrate at least 95% LSI with UE Y-balance for improved postural control  - pt will pass push-up and pull-up testing to demonstrate normalized endurance  - pt will be able to perform lifting activities with appropriate mechanics and no pain    Plan     Improved Left GH jt mobility, thoracic mobility, along with strengthening scapular and RTC musculature to promote normal scapulo humeral rythym to support functional patterning    Kassandra Coronado, PT, DPT

## 2022-12-08 ENCOUNTER — OFFICE VISIT (OUTPATIENT)
Dept: FAMILY MEDICINE | Facility: CLINIC | Age: 37
End: 2022-12-08
Payer: OTHER GOVERNMENT

## 2022-12-08 VITALS
DIASTOLIC BLOOD PRESSURE: 76 MMHG | BODY MASS INDEX: 27.67 KG/M2 | WEIGHT: 150.38 LBS | SYSTOLIC BLOOD PRESSURE: 114 MMHG | RESPIRATION RATE: 14 BRPM | HEART RATE: 61 BPM | HEIGHT: 62 IN

## 2022-12-08 DIAGNOSIS — F41.8 SITUATIONAL ANXIETY: Primary | ICD-10-CM

## 2022-12-08 DIAGNOSIS — Z00.00 PREVENTATIVE HEALTH CARE: ICD-10-CM

## 2022-12-08 DIAGNOSIS — R61 HYPERHIDROSIS: ICD-10-CM

## 2022-12-08 PROCEDURE — 99214 OFFICE O/P EST MOD 30 MIN: CPT | Mod: S$PBB,,, | Performed by: INTERNAL MEDICINE

## 2022-12-08 PROCEDURE — 99214 PR OFFICE/OUTPT VISIT, EST, LEVL IV, 30-39 MIN: ICD-10-PCS | Mod: S$PBB,,, | Performed by: INTERNAL MEDICINE

## 2022-12-08 PROCEDURE — 99999 PR PBB SHADOW E&M-EST. PATIENT-LVL III: ICD-10-PCS | Mod: PBBFAC,,, | Performed by: INTERNAL MEDICINE

## 2022-12-08 PROCEDURE — 99213 OFFICE O/P EST LOW 20 MIN: CPT | Mod: PBBFAC,PN | Performed by: INTERNAL MEDICINE

## 2022-12-08 PROCEDURE — 99999 PR PBB SHADOW E&M-EST. PATIENT-LVL III: CPT | Mod: PBBFAC,,, | Performed by: INTERNAL MEDICINE

## 2022-12-08 RX ORDER — PROPRANOLOL HYDROCHLORIDE 10 MG/1
10-20 TABLET ORAL 3 TIMES DAILY PRN
Qty: 180 TABLET | Refills: 0 | Status: SHIPPED | OUTPATIENT
Start: 2022-12-08 | End: 2024-01-26

## 2022-12-08 NOTE — PROGRESS NOTES
Assessment and Plan:    1. Situational anxiety  Discussed options, will start with propranolol PRN. If this is not enough to be effective, would consider adding sertraline (lower likelihood of diaphoresis compared to other SSRIs/SNRIs)  - propranoloL (INDERAL) 10 MG tablet; Take 1-2 tablets (10-20 mg total) by mouth 3 (three) times daily as needed (anxiety).  Dispense: 180 tablet; Refill: 0  - TSH; Future    2. Hyperhidrosis  - aluminum chloride (DRYSOL) 20 % external solution; Apply topically every evening.  Dispense: 60 mL; Refill: 0  - TSH; Future    3. Preventative health care  - Comprehensive Metabolic Panel; Future  - CBC Auto Differential; Future  - Lipid Panel; Future  - TSH; Future  - Hemoglobin A1C; Future  - HIV 1/2 Ag/Ab (4th Gen); Future  - Hepatitis C Antibody; Future      ______________________________________________________________________  Subjective:    Chief Complaint:  Discuss anxiety    HPI:  Paige is a 37 y.o. year old female here to discuss anxiety, sweating    She has been having more anxiety lately. 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 Notes that when she feels very anxious like this her heart rate will go from the 60s to the 90s, and she has increased sweating.     She notes that she also has a lot of sweating in general unrelated to anxiety as well. Not localized only to underarms, but this is definitely a problem area for her.     She has been prescribed effexor in the past which has been partially helpful for anxiety.     Medications:  Current Outpatient Medications on File Prior to Visit   Medication Sig Dispense Refill    azelastine (ASTELIN) 137 mcg (0.1 %) nasal spray 1 spray (137 mcg total) by Nasal route 2 (two) times daily. (Patient taking differently: 1 spray by Nasal route 2 (two) times daily.) 30 mL 0    eletriptan (RELPAX) 40 MG tablet Take 1 tablet (40 mg total) by mouth as needed. may repeat in 2 hours if necessary 30 tablet 1     "levonorgestrel (MIRENA) 20 mcg/24 hr (5 years) IUD 1 each by Intrauterine route once.      meloxicam (MOBIC) 15 MG tablet Take 1 tablet (15 mg total) by mouth once daily. 30 tablet 0     No current facility-administered medications on file prior to visit.       Review of Systems:  Review of Systems   Constitutional:  Positive for diaphoresis. Negative for chills and fever.   Endocrine: Positive for heat intolerance. Negative for cold intolerance.   Psychiatric/Behavioral:  Negative for dysphoric mood. The patient is nervous/anxious.      Past Medical History:  Past Medical History:   Diagnosis Date    Migraine aura without headache     Migraine headache     TMJ arthritis        Objective:    Vitals:  Vitals:    12/08/22 1327   BP: 114/76   Pulse: 61   Resp: 14   Weight: 68.2 kg (150 lb 5.7 oz)   Height: 5' 2" (1.575 m)   PainSc: 0-No pain       Physical Exam  Vitals reviewed.   Constitutional:       General: She is not in acute distress.     Appearance: She is well-developed.   Eyes:      General:         Right eye: No discharge.         Left eye: No discharge.      Conjunctiva/sclera: Conjunctivae normal.   Cardiovascular:      Rate and Rhythm: Normal rate and regular rhythm.   Pulmonary:      Effort: Pulmonary effort is normal. No respiratory distress.   Skin:     General: Skin is warm and dry.   Neurological:      Mental Status: She is alert and oriented to person, place, and time.   Psychiatric:         Behavior: Behavior normal.         Thought Content: Thought content normal.         Judgment: Judgment normal.       Data:  TSH normal when last cooked in 2016      Megan Sierra MD  Internal Medicine    "

## 2023-01-04 DIAGNOSIS — F41.8 SITUATIONAL ANXIETY: ICD-10-CM

## 2023-01-04 RX ORDER — PROPRANOLOL HYDROCHLORIDE 10 MG/1
TABLET ORAL
Qty: 540 TABLET | OUTPATIENT
Start: 2023-01-04

## 2023-01-04 NOTE — TELEPHONE ENCOUNTER
No new care gaps identified.  [facility] Embedded Care Gaps. Reference number: [id number]. 1/04/2023   3:28:44 AM CST

## 2023-05-11 ENCOUNTER — OFFICE VISIT (OUTPATIENT)
Dept: OPTOMETRY | Facility: CLINIC | Age: 38
End: 2023-05-11
Payer: OTHER GOVERNMENT

## 2023-05-11 DIAGNOSIS — Z98.890 HISTORY OF LASER REFRACTIVE SURGERY: ICD-10-CM

## 2023-05-11 DIAGNOSIS — H52.13 MYOPIA OF BOTH EYES WITH ASTIGMATISM: Primary | ICD-10-CM

## 2023-05-11 DIAGNOSIS — H52.203 MYOPIA OF BOTH EYES WITH ASTIGMATISM: Primary | ICD-10-CM

## 2023-05-11 PROCEDURE — 92015 PR REFRACTION: ICD-10-PCS | Mod: ,,, | Performed by: OPTOMETRIST

## 2023-05-11 PROCEDURE — 99999 PR PBB SHADOW E&M-EST. PATIENT-LVL III: ICD-10-PCS | Mod: PBBFAC,,, | Performed by: OPTOMETRIST

## 2023-05-11 PROCEDURE — 92015 DETERMINE REFRACTIVE STATE: CPT | Mod: ,,, | Performed by: OPTOMETRIST

## 2023-05-11 PROCEDURE — 99999 PR PBB SHADOW E&M-EST. PATIENT-LVL III: CPT | Mod: PBBFAC,,, | Performed by: OPTOMETRIST

## 2023-05-11 PROCEDURE — 99213 OFFICE O/P EST LOW 20 MIN: CPT | Mod: PBBFAC,PO | Performed by: OPTOMETRIST

## 2023-05-11 PROCEDURE — 92004 PR EYE EXAM, NEW PATIENT,COMPREHESV: ICD-10-PCS | Mod: S$PBB,,, | Performed by: OPTOMETRIST

## 2023-05-11 PROCEDURE — 92004 COMPRE OPH EXAM NEW PT 1/>: CPT | Mod: S$PBB,,, | Performed by: OPTOMETRIST

## 2023-05-12 NOTE — PROGRESS NOTES
HPI     Blurred Vision     Additional comments: Blur ou at dist, x mos, no assoc pain or red, no   relief over time, constant            Comments    Dle- 01/29/2019-Dr. Romero    Pt here for eye exam. Pt sts va is a little blurry while driving at night   or watching tv, pt sts has gls rx but fernandez snot wear them often. Denies   flashes/floaters/eye pain. No gtts. Lasik done [facility] in 2009          Last edited by Karsten Truong, OD on 5/11/2023  4:24 PM.            Assessment /Plan     For exam results, see Encounter Report.    Myopia of both eyes with astigmatism    History of laser refractive surgery      Spectacle Rx given, discussed different options for glasses. RTC 1 year routine eye exam.   2. S/P Lasik

## 2023-07-13 ENCOUNTER — LAB VISIT (OUTPATIENT)
Dept: LAB | Facility: HOSPITAL | Age: 38
End: 2023-07-13
Attending: INTERNAL MEDICINE
Payer: OTHER GOVERNMENT

## 2023-07-13 DIAGNOSIS — Z00.00 PREVENTATIVE HEALTH CARE: ICD-10-CM

## 2023-07-13 DIAGNOSIS — F41.8 SITUATIONAL ANXIETY: ICD-10-CM

## 2023-07-13 DIAGNOSIS — R61 HYPERHIDROSIS: ICD-10-CM

## 2023-07-13 LAB
ALBUMIN SERPL BCP-MCNC: 4.4 G/DL (ref 3.5–5.2)
ALP SERPL-CCNC: 49 U/L (ref 55–135)
ALT SERPL W/O P-5'-P-CCNC: 23 U/L (ref 10–44)
ANION GAP SERPL CALC-SCNC: 13 MMOL/L (ref 8–16)
AST SERPL-CCNC: 19 U/L (ref 10–40)
BASOPHILS # BLD AUTO: 0.05 K/UL (ref 0–0.2)
BASOPHILS NFR BLD: 0.5 % (ref 0–1.9)
BILIRUB SERPL-MCNC: 0.6 MG/DL (ref 0.1–1)
BUN SERPL-MCNC: 16 MG/DL (ref 6–20)
CALCIUM SERPL-MCNC: 9.5 MG/DL (ref 8.7–10.5)
CHLORIDE SERPL-SCNC: 104 MMOL/L (ref 95–110)
CHOLEST SERPL-MCNC: 206 MG/DL (ref 120–199)
CHOLEST/HDLC SERPL: 3.4 {RATIO} (ref 2–5)
CO2 SERPL-SCNC: 21 MMOL/L (ref 23–29)
CREAT SERPL-MCNC: 0.8 MG/DL (ref 0.5–1.4)
DIFFERENTIAL METHOD: NORMAL
EOSINOPHIL # BLD AUTO: 0.1 K/UL (ref 0–0.5)
EOSINOPHIL NFR BLD: 0.8 % (ref 0–8)
ERYTHROCYTE [DISTWIDTH] IN BLOOD BY AUTOMATED COUNT: 12.2 % (ref 11.5–14.5)
EST. GFR  (NO RACE VARIABLE): >60 ML/MIN/1.73 M^2
ESTIMATED AVG GLUCOSE: 97 MG/DL (ref 68–131)
GLUCOSE SERPL-MCNC: 75 MG/DL (ref 70–110)
HBA1C MFR BLD: 5 % (ref 4–5.6)
HCT VFR BLD AUTO: 42.8 % (ref 37–48.5)
HCV AB SERPL QL IA: NORMAL
HDLC SERPL-MCNC: 61 MG/DL (ref 40–75)
HDLC SERPL: 29.6 % (ref 20–50)
HGB BLD-MCNC: 14 G/DL (ref 12–16)
HIV 1+2 AB+HIV1 P24 AG SERPL QL IA: NORMAL
IMM GRANULOCYTES # BLD AUTO: 0.02 K/UL (ref 0–0.04)
IMM GRANULOCYTES NFR BLD AUTO: 0.2 % (ref 0–0.5)
LDLC SERPL CALC-MCNC: 116 MG/DL (ref 63–159)
LYMPHOCYTES # BLD AUTO: 3.3 K/UL (ref 1–4.8)
LYMPHOCYTES NFR BLD: 35.4 % (ref 18–48)
MCH RBC QN AUTO: 30.2 PG (ref 27–31)
MCHC RBC AUTO-ENTMCNC: 32.7 G/DL (ref 32–36)
MCV RBC AUTO: 92 FL (ref 82–98)
MONOCYTES # BLD AUTO: 0.5 K/UL (ref 0.3–1)
MONOCYTES NFR BLD: 5.2 % (ref 4–15)
NEUTROPHILS # BLD AUTO: 5.4 K/UL (ref 1.8–7.7)
NEUTROPHILS NFR BLD: 57.9 % (ref 38–73)
NONHDLC SERPL-MCNC: 145 MG/DL
NRBC BLD-RTO: 0 /100 WBC
PLATELET # BLD AUTO: 274 K/UL (ref 150–450)
PMV BLD AUTO: 10.5 FL (ref 9.2–12.9)
POTASSIUM SERPL-SCNC: 3.8 MMOL/L (ref 3.5–5.1)
PROT SERPL-MCNC: 7 G/DL (ref 6–8.4)
RBC # BLD AUTO: 4.64 M/UL (ref 4–5.4)
SODIUM SERPL-SCNC: 138 MMOL/L (ref 136–145)
TRIGL SERPL-MCNC: 145 MG/DL (ref 30–150)
TSH SERPL DL<=0.005 MIU/L-ACNC: 2.23 UIU/ML (ref 0.4–4)
WBC # BLD AUTO: 9.29 K/UL (ref 3.9–12.7)

## 2023-07-13 PROCEDURE — 80061 LIPID PANEL: CPT | Performed by: INTERNAL MEDICINE

## 2023-07-13 PROCEDURE — 83036 HEMOGLOBIN GLYCOSYLATED A1C: CPT | Performed by: INTERNAL MEDICINE

## 2023-07-13 PROCEDURE — 36415 COLL VENOUS BLD VENIPUNCTURE: CPT | Performed by: INTERNAL MEDICINE

## 2023-07-13 PROCEDURE — 86803 HEPATITIS C AB TEST: CPT | Performed by: INTERNAL MEDICINE

## 2023-07-13 PROCEDURE — 80053 COMPREHEN METABOLIC PANEL: CPT | Performed by: INTERNAL MEDICINE

## 2023-07-13 PROCEDURE — 87389 HIV-1 AG W/HIV-1&-2 AB AG IA: CPT | Performed by: INTERNAL MEDICINE

## 2023-07-13 PROCEDURE — 85025 COMPLETE CBC W/AUTO DIFF WBC: CPT | Performed by: INTERNAL MEDICINE

## 2023-07-13 PROCEDURE — 84443 ASSAY THYROID STIM HORMONE: CPT | Performed by: INTERNAL MEDICINE

## 2023-07-14 ENCOUNTER — OFFICE VISIT (OUTPATIENT)
Dept: FAMILY MEDICINE | Facility: CLINIC | Age: 38
End: 2023-07-14
Payer: OTHER GOVERNMENT

## 2023-07-14 VITALS — WEIGHT: 153 LBS | BODY MASS INDEX: 28.16 KG/M2 | HEIGHT: 62 IN

## 2023-07-14 DIAGNOSIS — R41.840 DIFFICULTY CONCENTRATING: Primary | ICD-10-CM

## 2023-07-14 DIAGNOSIS — F41.9 ANXIETY: ICD-10-CM

## 2023-07-14 DIAGNOSIS — L60.8 ACQUIRED DEFORMITY OF TOENAIL: ICD-10-CM

## 2023-07-14 PROCEDURE — 99214 PR OFFICE/OUTPT VISIT, EST, LEVL IV, 30-39 MIN: ICD-10-PCS | Mod: 95,,, | Performed by: INTERNAL MEDICINE

## 2023-07-14 PROCEDURE — 99214 OFFICE O/P EST MOD 30 MIN: CPT | Mod: 95,,, | Performed by: INTERNAL MEDICINE

## 2023-07-14 RX ORDER — BUPROPION HYDROCHLORIDE 150 MG/1
150 TABLET ORAL DAILY
Qty: 30 TABLET | Refills: 2 | Status: SHIPPED | OUTPATIENT
Start: 2023-07-14 | End: 2023-08-10

## 2023-07-14 NOTE — PROGRESS NOTES
Assessment and Plan:    1. Difficulty concentrating  Discussed medication options for difficulty concentrating and the related anxiety and have elected to start wellbutrin while pursuing workup through psychiatry for possible ADD or ADHD. We discussed how to take this medication and the likely time to effect of this medication. We discussed potential side effects and to let me know if she is having any of these side effects. Follow up in 2 months.  - buPROPion (WELLBUTRIN XL) 150 MG TB24 tablet; Take 1 tablet (150 mg total) by mouth once daily.  Dispense: 30 tablet; Refill: 2  - Ambulatory referral/consult to Psychiatry; Future    2. Anxiety  - buPROPion (WELLBUTRIN XL) 150 MG TB24 tablet; Take 1 tablet (150 mg total) by mouth once daily.  Dispense: 30 tablet; Refill: 2  - Ambulatory referral/consult to Psychiatry; Future    3. Acquired deformity of toenail  Discussed that per description this sounds more like a dystrophic toenail due to chronic pressure from running than fungal disease, but not able to say for sure. Recommend seeing podiatry.  - Ambulatory referral/consult to Podiatry; Future    ______________________________________________________________________  Subjective:    Chief Complaint:  Discuss mood, weight, toenail problem    HPI:  Paige is a 38 y.o. year old female patient with telemedicine visit today as consultation to discuss mood, weight, and a problem with her toenail.    The patient location is: home in [place]  The chief complaint leading to consultation is: as above    Visit type: audiovisual    Face to Face time with patient: 15 minutes  20 minutes of total time spent on the encounter, which includes face to face time and non-face to face time preparing to see the patient (eg, review of tests), Obtaining and/or reviewing separately obtained history, Documenting clinical information in the electronic or other health record, Independently interpreting results (not separately reported) and  communicating results to the patient/family/caregiver, or Care coordination (not separately reported).         Each patient to whom he or she provides medical services by telemedicine is:  (1) informed of the relationship between the physician and patient and the respective role of any other health care provider with respect to management of the patient; and (2) notified that he or she may decline to receive medical services by telemedicine and may withdraw from such care at any time.    Notes:     She would like to discuss mental health concerns. She has been dealing with anxiety and having a lot of of difficulty concentrating and completing tasks. She had previously been referred to psychiatry, but was not able to schedule.    She has been using propranolol as needed for social anxiety. This has been helpful.     She is very active, running 9 miles per week or doing stair climber or cycling. She is frustrated as she is not really losing weight despite this. Not doing anything in particular for her diet, but eats a generally healthy diet.     She notes that her 2nd toenail on her right foot has been thickened and yellow. She did notice this start after she started running regularly. Her 2nd toe is her longest toe and does press up against her shoe with running.    Medications:  Current Outpatient Medications on File Prior to Visit   Medication Sig Dispense Refill    aluminum chloride (DRYSOL) 20 % external solution Apply topically every evening. (Patient not taking: Reported on 5/11/2023) 60 mL 0    azelastine (ASTELIN) 137 mcg (0.1 %) nasal spray 1 spray (137 mcg total) by Nasal route 2 (two) times daily. (Patient taking differently: 1 spray by Nasal route 2 (two) times daily.) 30 mL 0    eletriptan (RELPAX) 40 MG tablet Take 1 tablet (40 mg total) by mouth as needed. may repeat in 2 hours if necessary 30 tablet 1    levonorgestrel (MIRENA) 20 mcg/24 hr (5 years) IUD 1 each by Intrauterine route once.       "meloxicam (MOBIC) 15 MG tablet Take 1 tablet (15 mg total) by mouth once daily. (Patient not taking: Reported on 5/11/2023) 30 tablet 0    propranoloL (INDERAL) 10 MG tablet Take 1-2 tablets (10-20 mg total) by mouth 3 (three) times daily as needed (anxiety). (Patient not taking: Reported on 5/11/2023) 180 tablet 0     No current facility-administered medications on file prior to visit.       Review of Systems:  Review of Systems   Constitutional:  Negative for activity change and unexpected weight change.   HENT:  Negative for hearing loss, rhinorrhea and trouble swallowing.    Eyes:  Negative for discharge and visual disturbance.   Respiratory:  Negative for chest tightness and wheezing.    Cardiovascular:  Negative for chest pain and palpitations.   Gastrointestinal:  Negative for blood in stool, constipation, diarrhea and vomiting.   Endocrine: Negative for polydipsia and polyuria.   Genitourinary:  Negative for difficulty urinating, dysuria, hematuria and menstrual problem.   Musculoskeletal:  Negative for arthralgias, joint swelling and neck pain.   Neurological:  Negative for weakness and headaches.   Psychiatric/Behavioral:  Negative for confusion and dysphoric mood.      Past Medical History:  Past Medical History:   Diagnosis Date    Migraine aura without headache     Migraine headache     TMJ arthritis        Objective:    Vitals:  Vitals:    07/14/23 1007   Weight: 69.4 kg (153 lb)   Height: 5' 2" (1.575 m)     Body mass index is 27.98 kg/m².      Physical Exam  Constitutional:       General: She is not in acute distress.     Appearance: She is well-developed.   HENT:      Head: Normocephalic and atraumatic.   Pulmonary:      Effort: Pulmonary effort is normal. No respiratory distress.   Neurological:      Mental Status: She is alert and oriented to person, place, and time.   Psychiatric:         Behavior: Behavior normal.         Thought Content: Thought content normal.         Judgment: Judgment " normal.       Data:  TSH normal      Megan Sierra MD  Internal Medicine

## 2023-08-10 ENCOUNTER — OFFICE VISIT (OUTPATIENT)
Dept: PSYCHIATRY | Facility: CLINIC | Age: 38
End: 2023-08-10
Payer: OTHER GOVERNMENT

## 2023-08-10 DIAGNOSIS — R41.840 DIFFICULTY CONCENTRATING: ICD-10-CM

## 2023-08-10 DIAGNOSIS — F41.9 ANXIETY: ICD-10-CM

## 2023-08-10 DIAGNOSIS — F41.8 OTHER SPECIFIED ANXIETY DISORDERS: ICD-10-CM

## 2023-08-10 PROCEDURE — 90792 PSYCH DIAG EVAL W/MED SRVCS: CPT | Mod: 95,,, | Performed by: PSYCHOLOGIST

## 2023-08-10 PROCEDURE — 90792 PR PSYCHIATRIC DIAGNOSTIC EVALUATION W/MEDICAL SERVICES: ICD-10-PCS | Mod: 95,,, | Performed by: PSYCHOLOGIST

## 2023-08-10 RX ORDER — ESCITALOPRAM OXALATE 10 MG/1
10 TABLET ORAL DAILY
Qty: 30 TABLET | Refills: 1 | Status: SHIPPED | OUTPATIENT
Start: 2023-08-10 | End: 2023-09-20

## 2023-08-10 NOTE — LETTER
August 10, 2023        Megan Sierra MD  [address]  [place] [address]             [facility]  [address]  [facility] [address]  Phone: [phone]   Patient: Paige Barros   MR Number: [id number]   YOB: 1985   Date of Visit: 8/10/2023       Dear Dr. Sierra:    Thank you for referring Paige Barros to me for evaluation. Below are the relevant portions of my assessment and plan of care.    Pt is to stop Wellbutrin and start Lexapro 10mg once daily. Pt will continue Propranolol 10mg TID PRN. Will continue to monitor and follow.    If you have questions, please do not hesitate to call me. I look forward to following Paige along with you.    Sincerely,      Eder Lee, PhD, MP           CC    No Recipients

## 2023-08-10 NOTE — PROGRESS NOTES
The patient location is: [place], [place]  The chief complaint leading to consultation is: ADHD evaluation  Visit type: Virtual visit with synchronous audio and video  Each patient to whom he or she provides medical services by telemedicine is:  (1) informed of the relationship between the physician and patient and the respective role of any other health care provider with respect to management of the patient; and (2) notified that he or she may decline to receive medical services by telemedicine and may withdraw from such care at any time.  Face-to-Face time: 46 minutes    Notes:      Outpatient Psychiatric Initial Visit  08/10/2023     ID:   Patient presents for an initial evaluation.      Reason for encounter: Referral from Dr. Sierra     Chief Complaint: ADHD evaluation    History of Presenting Illness:  Pt described an idyllic childhood raised by biological mother and father in separate households. Pt had step-parents and brothers in both homes and described a happy and pt was well cared for. Pt denied any trauma or abuse in or outside of the household. Pt said that she did well academically and socially in school.    Pt went into the [employer] and excelled. Pt left after 12 years and has been working at [employer] in [employer] for over 10 years. Pt said that it is at work that she struggles with staying focused but still gets good reports.     Pt shared that she struggled with anxiety in the past as a young adult. Pt said that she would get pale and feel faint and was worked up for this and told that it was anxiety. In the past six months, pt found that she is getting routinely anxious at work with flushing.     Depression symptoms: pt denied current or hx of symptoms     Anxiety symptoms: Pt described flushing, feeling anxious in social situations, and excessive worry. Pt may have had atypical panic attacks in the past that were mistaken as feeling faint. Pt denied symptoms consistent with WILLIAN,  OCD, panic, phobias, or social anxiety.     Ladonna/Hypomania Symptoms: Pt denied current or history of related symptoms.    Psychosis Symptoms: Pt denied current or history of related symptoms.    Attention/Concentration Symptoms: Pt endorsed symptoms of inattention but denied a long history of symptoms and denied this causing functional impairment in more than one setting. Pt endorsed the following symptoms:    Inattentive:  Often has trouble organizing tasks and activities.  Often avoids, dislikes, or is reluctant to do tasks that require mental effort over a long period of time (such as schoolwork or homework).  Is often easily distracted  Is often forgetful in daily activities.  Hyperactive  Often leaves seat in situations when remaining seated is expected.    Disordered Eating/Body Image Concerns: Pt denied current or history of related symptoms.    Suicidal Ideation and Risk: Pt denied current or history of related symptoms.    Homicidal/Violent Ideation and Risk: Pt denied current or history of related symptoms.    Criminal History: Pt denied.    Prior Psychiatric Treatment/Hospitalizations: Pt denied.     Current psychiatric medication: Propranolol 10mg TID PRN    Prior psychiatric medication trials: Wellbutrin (took for 4 days)    Current Medical Conditions Per Chart Review:   Patient Active Problem List   Diagnosis    Migraine headache    IUD contraception- mirena placed 7/11/2018,  lot#[id number]    Shoulder weakness    Back stiffness      Family Psychiatric History:  son and father - ADHD    Alcohol Use: Pt reported minimal, infrequent alcohol use and denied a history of problematic drinking.    Tobacco and Drug Use: Pt denied tobacco or drug use.     Social History:  Pt is  to her second  and has a 16 year old son and 10 year old daughter. Pt described good relationships in the family. Pt works full-time for [employer] in [employer] and functions well. Pt eats well and exercises regularly. Pt  drinks infrequently and minimally. Pt denied cigarette and drug use.     Trauma history:  pt denied     Mental Status Exam      Physical Exam  Psychiatric:         Attention and Perception: Attention normal.         Mood and Affect: Mood is anxious.         Speech: Speech normal.         Behavior: Behavior normal. Behavior is cooperative.         Thought Content: Thought content normal. Thought content is not paranoid or delusional. Thought content does not include homicidal or suicidal ideation. Thought content does not include homicidal or suicidal plan.         Cognition and Memory: Cognition and memory normal.         Judgment: Judgment normal.      Comments: General appearance:  casually groomed, casually dressed    Behavior:  calm, engaged    Demeanor:  pleasant, cooperative    Mood:  anxious   Affect:  nervous  Speech:  regular rate, tone and volume    Thought Process:  linear and goal directed    Thought Content:  appropriate - absent of aggressive or self injurious thoughts, feelings or impulses    Insight into Current Situation:  fair    Judgement: fair   Expected Ability to Adhere to Treatment plan: good        Current Evaluation:  Nutritional Screening:  Considering the patient's height and weight, medications, medical history and preferences, should a referral be made to the dietitian? No  Vitals: most recent vitals signs, dated greater than 90 days prior to this appointment, were reviewed  General: age appropriate, well nourished, casually dressed, neatly groomed  MSK: muscle strength/tone : no tremor or abnormal movements. Gait/Station: no ataxic, steady    Clinical Assessment :     Pt comes to be assessed for ADHD. Pt does not appear to meet criteria for ADHD but is struggling with anxiety that may be affecting her productivity and concentration. Will start SSRI treatment.     Diagnosis(es):   1) Anxiety Disorder, other    Plan      Goal #1: Improve attention/concentration    Pt is to stop Wellbutrin  and start Lexapro 10mg once daily. Pt will continue Propranolol 10mg TID PRN.     Treatment plan and medication changes will be coordinated with PCP, Dr. Sierra    This author reviewed limits to confidentiality and this author's collaboration with pt's physician. Pt indicated understanding and denied any questions.    Return to Clinic: 6 weeks or earlier PRN    -Call to report any worsening of symptoms or problems associated with medication  - Pt instructed to go to ER if thoughts of harming self or others arise   Spent 45 minutes face-to-face with patient during evaluation.    -Supportive therapy and psychoeducation provided  -R/B/SE's of medications discussed with the pt who expresses understanding and chooses to take medications as prescribed.   -Pt instructed to call clinic, 911 or go to nearest emergency room if sxs worsen or pt is in   crisis. The pt expresses understanding.    Antidepressant/Antianxiety Medication Initiation:  Patient informed of risks, benefits, and potential side effects of medication and accepts informed consent.  Common side effects include nausea, fatigue, headache, insomnia., Specifically discussed the possibility of new or worsening suicidal thoughts/depression.  Patient instructed to stop the medication immediately and seek urgent treatment if this occurs. Patient instructed not to abruptly discontinue medication without physician guidance except in cases of sudden onset or worsening of SI.        Pregnancy Warning:  Patient denies current pregnancy possibility.  Patient made aware that medications have not been proven safe in pregnancy and that she must maintain adequate birth control.  Patient instructed to alert us immediately if she becomes pregnant.

## 2023-08-15 ENCOUNTER — OFFICE VISIT (OUTPATIENT)
Dept: FAMILY MEDICINE | Facility: CLINIC | Age: 38
End: 2023-08-15
Payer: OTHER GOVERNMENT

## 2023-08-15 VITALS
WEIGHT: 153.44 LBS | SYSTOLIC BLOOD PRESSURE: 112 MMHG | OXYGEN SATURATION: 97 % | DIASTOLIC BLOOD PRESSURE: 80 MMHG | HEIGHT: 62 IN | BODY MASS INDEX: 28.24 KG/M2 | HEART RATE: 65 BPM

## 2023-08-15 DIAGNOSIS — R10.13 EPIGASTRIC PAIN: Primary | ICD-10-CM

## 2023-08-15 PROCEDURE — 99999 PR PBB SHADOW E&M-EST. PATIENT-LVL IV: ICD-10-PCS | Mod: PBBFAC,,, | Performed by: PHYSICIAN ASSISTANT

## 2023-08-15 PROCEDURE — 99214 OFFICE O/P EST MOD 30 MIN: CPT | Mod: PBBFAC,PO | Performed by: PHYSICIAN ASSISTANT

## 2023-08-15 PROCEDURE — 99213 OFFICE O/P EST LOW 20 MIN: CPT | Mod: S$PBB,,, | Performed by: PHYSICIAN ASSISTANT

## 2023-08-15 PROCEDURE — 99213 PR OFFICE/OUTPT VISIT, EST, LEVL III, 20-29 MIN: ICD-10-PCS | Mod: S$PBB,,, | Performed by: PHYSICIAN ASSISTANT

## 2023-08-15 PROCEDURE — 99999 PR PBB SHADOW E&M-EST. PATIENT-LVL IV: CPT | Mod: PBBFAC,,, | Performed by: PHYSICIAN ASSISTANT

## 2023-08-15 RX ORDER — PANTOPRAZOLE SODIUM 20 MG/1
20 TABLET, DELAYED RELEASE ORAL DAILY
Qty: 30 TABLET | Refills: 1 | Status: SHIPPED | OUTPATIENT
Start: 2023-08-15 | End: 2023-09-12

## 2023-08-15 NOTE — PROGRESS NOTES
"Subjective:      Patient ID: Paige Barros is a 38 y.o. female.    Chief Complaint: Follow-up    Patient is new to me.    HPI  Patient has PMH of migraines and anxiety.    Patient went to [facility] ED 8/14/2023 with epigastric stomach pain and headache since Friday.  Had a bout of dizziness and diaphoresis and called EMS.  Had fluids, toradol, and benedryl with some relief in the ED.  Started lexapro 10mg on Saturday.  No sick contacts.  Home covid test was negative.  Denies fever, chest pain, or shortness of breath.    Review of Systems   Constitutional:  Positive for appetite change and diaphoresis. Negative for chills and fever.   HENT:  Negative for ear pain.    Respiratory:  Negative for shortness of breath.    Cardiovascular:  Negative for chest pain.   Gastrointestinal:  Positive for abdominal pain, diarrhea and nausea. Negative for constipation and vomiting.   Neurological:  Positive for dizziness and headaches.       Objective:   /80   Pulse 65   Ht 5' 2" (1.575 m)   Wt 69.6 kg (153 lb 7 oz)   SpO2 97%   BMI 28.06 kg/m²     Physical Exam  Vitals reviewed.   Constitutional:       Appearance: Normal appearance. She is well-developed.   HENT:      Head: Normocephalic and atraumatic.      Right Ear: Hearing and external ear normal.      Left Ear: Hearing and external ear normal.      Nose:      Right Sinus: No maxillary sinus tenderness or frontal sinus tenderness.      Left Sinus: No maxillary sinus tenderness or frontal sinus tenderness.   Eyes:      General: Lids are normal.      Conjunctiva/sclera: Conjunctivae normal.   Cardiovascular:      Rate and Rhythm: Normal rate and regular rhythm.      Heart sounds: Normal heart sounds. No murmur heard.     No friction rub. No gallop.   Pulmonary:      Effort: Pulmonary effort is normal. No respiratory distress.      Breath sounds: Normal breath sounds. No decreased breath sounds, wheezing, rhonchi or rales.   Abdominal:      General: Bowel sounds are " normal.      Palpations: Abdomen is soft.      Tenderness: There is abdominal tenderness in the epigastric area.   Musculoskeletal:         General: Normal range of motion.   Lymphadenopathy:      Cervical: No cervical adenopathy.   Skin:     General: Skin is warm and dry.      Findings: No rash.   Neurological:      General: No focal deficit present.      Mental Status: She is alert and oriented to person, place, and time.   Psychiatric:         Mood and Affect: Mood normal.         Behavior: Behavior normal. Behavior is cooperative.         Judgment: Judgment normal.       Assessment:      1. Epigastric pain       Plan:   1. Epigastric pain  Suspect she had a virus.  Advised to start with lexapro 5mg nightly after eating to minimize side effects.  - pantoprazole (PROTONIX) 20 MG tablet; Take 1 tablet (20 mg total) by mouth once daily.  Dispense: 30 tablet; Refill: 1    Follow up as needed.  Patient agreed with plan and expressed understanding.    Thank you for allowing me to serve you,

## 2023-09-11 NOTE — TELEPHONE ENCOUNTER
No care due was identified.  [facility] Embedded Care Due Messages. Reference number: [id number].   9/11/2023 11:36:25 AM CDT

## 2023-09-12 DIAGNOSIS — R10.13 EPIGASTRIC PAIN: ICD-10-CM

## 2023-09-12 RX ORDER — PANTOPRAZOLE SODIUM 20 MG/1
20 TABLET, DELAYED RELEASE ORAL
Qty: 90 TABLET | Refills: 1 | Status: SHIPPED | OUTPATIENT
Start: 2023-09-12 | End: 2024-03-04

## 2023-09-13 RX ORDER — ELETRIPTAN HYDROBROMIDE 40 MG/1
40 TABLET, FILM COATED ORAL
Qty: 30 TABLET | Refills: 1 | Status: SHIPPED | OUTPATIENT
Start: 2023-09-13 | End: 2024-01-26

## 2023-09-19 ENCOUNTER — PATIENT MESSAGE (OUTPATIENT)
Dept: PSYCHIATRY | Facility: CLINIC | Age: 38
End: 2023-09-19
Payer: OTHER GOVERNMENT

## 2023-09-20 ENCOUNTER — OFFICE VISIT (OUTPATIENT)
Dept: PSYCHIATRY | Facility: CLINIC | Age: 38
End: 2023-09-20
Payer: OTHER GOVERNMENT

## 2023-09-20 VITALS
DIASTOLIC BLOOD PRESSURE: 69 MMHG | SYSTOLIC BLOOD PRESSURE: 110 MMHG | WEIGHT: 149.25 LBS | HEART RATE: 64 BPM | BODY MASS INDEX: 27.47 KG/M2 | HEIGHT: 62 IN

## 2023-09-20 DIAGNOSIS — F41.8 OTHER SPECIFIED ANXIETY DISORDERS: Primary | ICD-10-CM

## 2023-09-20 PROCEDURE — 99999 PR PBB SHADOW E&M-EST. PATIENT-LVL III: ICD-10-PCS | Mod: PBBFAC,,, | Performed by: PSYCHOLOGIST

## 2023-09-20 PROCEDURE — 90833 PR PSYCHOTHERAPY W/PATIENT W/E&M, 30 MIN (ADD ON): ICD-10-PCS | Mod: ,,, | Performed by: PSYCHOLOGIST

## 2023-09-20 PROCEDURE — 90833 PSYTX W PT W E/M 30 MIN: CPT | Mod: ,,, | Performed by: PSYCHOLOGIST

## 2023-09-20 PROCEDURE — 99214 OFFICE O/P EST MOD 30 MIN: CPT | Mod: S$PBB,,, | Performed by: PSYCHOLOGIST

## 2023-09-20 PROCEDURE — 99999 PR PBB SHADOW E&M-EST. PATIENT-LVL III: CPT | Mod: PBBFAC,,, | Performed by: PSYCHOLOGIST

## 2023-09-20 PROCEDURE — 99213 OFFICE O/P EST LOW 20 MIN: CPT | Mod: PBBFAC,PO | Performed by: PSYCHOLOGIST

## 2023-09-20 PROCEDURE — 99214 PR OFFICE/OUTPT VISIT, EST, LEVL IV, 30-39 MIN: ICD-10-PCS | Mod: S$PBB,,, | Performed by: PSYCHOLOGIST

## 2023-09-20 RX ORDER — VENLAFAXINE HYDROCHLORIDE 75 MG/1
75 CAPSULE, EXTENDED RELEASE ORAL DAILY
Qty: 30 CAPSULE | Refills: 2 | Status: SHIPPED | OUTPATIENT
Start: 2023-09-27 | End: 2023-11-08 | Stop reason: SDUPTHER

## 2023-09-20 NOTE — PROGRESS NOTES
Outpatient Psychiatry Follow-Up Visit    Clinical Status of Patient: Outpatient (Ambulatory)  09/20/2023     Chief Complaint: 38 year old female presenting today for a follow-up.       Interval History and Content of Current Session:  Interim Events/Subjective Report/Content of Current Session:  follow-up appointment.    Pt is a 38 year old female with past psychiatric hx of social anxiety who presents for follow-up treatment. Pt started feeling ill before she took Lexapro and then took for two days and was much more ill and went to the ER. Pt stopped Lexapro for fear that it was related to the Lexapro. Pt's MD's surmised that it was a migraine and virus. Pt is feeling better now. Pt finds Propranolol partially helpful. Pt said that she lives with daily headaches and migraines and so we will try Effexor.    Past Psychiatric hx: Wellbutrin (four days); Lexapro (2 days)    Past Medical hx:   Past Medical History:   Diagnosis Date    Migraine aura without headache     Migraine headache     TMJ arthritis         Interim hx:  Medication changes last visit:   started and stopped Lexapro  Anxiety: moderate - unchanged  Depression: pt denied     Denies suicidal/homicidal ideations.  Denies hopelessness/worthlessness.    Denies auditory/visual hallucinations      Alcohol: minimal, infrequent  Drug: pt denied  Caffeine: minimal use  Tobacco: pt denied      Review of Systems   PSYCHIATRIC: Pertinent items are noted in the narrative.        CONSTITUTIONAL: weight stable    Past Medical, Family and Social History: The patient's past medical, family and social history have been reviewed and updated as appropriate within the electronic medical record. See encounter notes.     Current Psychiatric Medication:  Propranolol 10mg TID PRN     Compliance: yes      Side effects: pt denied     Risk Parameters:  Patient reports no suicidal ideation  Patient reports no homicidal ideation  Patient reports no self-injurious behavior  Patient  reports no violent behavior     Exam (detailed: at least 9 elements; comprehensive: all 15 elements)   Constitutional  Vitals:  Most recent vital signs, dated less than 90 days prior to this appointment, were reviewed. Pulse:  [64]   BP: (110)/(69)       General:  unremarkable, age appropriate, casual attire, good eye contact, good rapport       Musculoskeletal  Muscle Strength/Tone:  no flaccidity, no tremor    Gait & Station:  normal      Psychiatric                       Speech:  normal tone, normal rate, rhythm, and volume   Mood & Affect:   Depressed, anxious         Thought Process:   Goal directed; Linear    Associations:   intact   Thought Content:   No SI/HI, delusions, or paranoia, no AV/VH   Insight & Judgement:   Good, adequate to circumstances   Orientation:   grossly intact; alert and oriented x 4    Memory:  intact for content of interview    Language:  grossly intact, can repeat    Attention Span  : Grossly intact for content of interview   Fund of Knowledge:   intact and appropriate to age and level of education        Assessment and Diagnosis   Status/Progress: unchanged     Impression: Pt comes to be assessed for ADHD. Pt does not appear to meet criteria for ADHD but is struggling with anxiety that may be affecting her productivity and concentration. Will start SSRI treatment.     Diagnosis(es):   1) Anxiety Disorder, other    Intervention/Counseling/Treatment Plan   Medication Management:      1. Stop Lexapro     2. Continue Propranolol 10mg TID PRN     3. Start Effexor XR 37.5mg Q D for 7 days, then increase to 75mg Q D    4. Continue exercise regimen    5. Will defer counseling for now     6. Call to report any worsening of symptoms or problems with the medication. Pt instructed to go to ER with thoughts of harming self, others    Psychotherapy:   Target symptoms: anxiety  Why chosen therapy is appropriate versus another modality: CBT used; relevant to diagnosis, patient responds to this  modality  Outcome monitoring methods: self-report, observation  Therapeutic intervention type: Cognitive Behavioral Therapy  Topics discussed/themes: building skills sets for symptom management, symptom recognition, nutrition, exercise  The patient's response to the intervention is poor  Patient's response to treatment is: good.   The patient's progress toward treatment goals: unchanged  16 minutes spent with pt in psychotherapy and 5 minutes in medication management     Return to clinic: 7 weeks    -Cognitive-Behavioral/Supportive therapy and psychoeducation provided  -R/B/SE's of medications discussed with the pt who expresses understanding and chooses to take medications as prescribed.   -Pt instructed to call clinic, 911 or go to nearest emergency room if sxs worsen or pt is in   crisis. The pt expresses understanding.    Eder Lee, PhD, MP     Antidepressant/Antianxiety Medication Initiation:  Patient informed of risks, benefits, and potential side effects of medication and accepts informed consent.  Common side effects include nausea, fatigue, headache, insomnia., Specifically discussed the possibility of new or worsening suicidal thoughts/depression.  Patient instructed to stop the medication immediately and seek urgent treatment if this occurs. Patient instructed not to abruptly discontinue medication without physician guidance except in cases of sudden onset or worsening of SI.        Pregnancy Warning:  Patient denies current pregnancy possibility.  Patient made aware that medications have not been proven safe in pregnancy and that she must maintain adequate birth control.  Patient instructed to alert us immediately if she becomes pregnant.

## 2023-09-27 ENCOUNTER — OFFICE VISIT (OUTPATIENT)
Dept: PODIATRY | Facility: CLINIC | Age: 38
End: 2023-09-27
Payer: OTHER GOVERNMENT

## 2023-09-27 VITALS — WEIGHT: 149 LBS | HEIGHT: 62 IN | BODY MASS INDEX: 27.42 KG/M2

## 2023-09-27 DIAGNOSIS — L60.2 ONYCHOGRYPOSIS OF TOENAIL: Primary | ICD-10-CM

## 2023-09-27 DIAGNOSIS — L60.8 ACQUIRED DEFORMITY OF TOENAIL: ICD-10-CM

## 2023-09-27 PROCEDURE — 99213 OFFICE O/P EST LOW 20 MIN: CPT | Mod: PBBFAC,PO | Performed by: PODIATRIST

## 2023-09-27 PROCEDURE — 99999 PR PBB SHADOW E&M-EST. PATIENT-LVL III: CPT | Mod: PBBFAC,,, | Performed by: PODIATRIST

## 2023-09-27 PROCEDURE — 99202 PR OFFICE/OUTPT VISIT, NEW, LEVL II, 15-29 MIN: ICD-10-PCS | Mod: S$PBB,,, | Performed by: PODIATRIST

## 2023-09-27 PROCEDURE — 99999 PR PBB SHADOW E&M-EST. PATIENT-LVL III: ICD-10-PCS | Mod: PBBFAC,,, | Performed by: PODIATRIST

## 2023-09-27 PROCEDURE — 99202 OFFICE O/P NEW SF 15 MIN: CPT | Mod: S$PBB,,, | Performed by: PODIATRIST

## 2023-09-27 NOTE — PROGRESS NOTES
Subjective:      Patient ID: Paige Barros is a 38 y.o. female.    Chief Complaint: Toe Injury    Paige is a 38 y.o. female who presents to the clinic complaining of thick and discolored toenail on the right foot second toe. Paige is inquiring about treatment options. She feels this started after the nail was rubbing on the end of the shoe when running, she is a runner.    Review of Systems   Constitutional: Negative for chills and fever.   Cardiovascular:  Negative for claudication and leg swelling.   Respiratory:  Negative for shortness of breath.    Skin:  Positive for nail changes. Negative for itching and rash.   Musculoskeletal:  Negative for muscle cramps, muscle weakness and myalgias.   Gastrointestinal:  Negative for nausea and vomiting.   Neurological:  Negative for focal weakness, loss of balance, numbness and paresthesias.           Objective:      Physical Exam  Constitutional:       General: She is not in acute distress.     Appearance: She is well-developed. She is not diaphoretic.   Cardiovascular:      Pulses:           Dorsalis pedis pulses are 2+ on the right side and 2+ on the left side.        Posterior tibial pulses are 2+ on the right side and 2+ on the left side.      Comments: < 3 sec capillary refill time to toes 1-5 bilateral. Toes and feet are warm to touch proximally with normal distal cooling b/l. There is some hair growth on the feet and toes b/l. There is no edema b/l. No spider veins or varicosities present b/l.     Musculoskeletal:      Comments: Equinus noted b/l ankles with < 10 deg DF noted. MMT 5/5 in DF/PF/Inv/Ev resistance with no reproduction of pain in any direction. Passive range of motion of ankle and pedal joints is painless b/l.     Skin:     General: Skin is warm and dry.      Coloration: Skin is not pale.      Findings: No abrasion, bruising, burn, ecchymosis, erythema, laceration, lesion, petechiae or rash.      Nails: There is no clubbing.      Comments: Skin  temperature, texture and turgor within normal limits.    Right second toenail is slightly discolored and thick without subungual debris   Neurological:      Mental Status: She is alert and oriented to person, place, and time.      Sensory: No sensory deficit.      Motor: No tremor, atrophy or abnormal muscle tone.      Comments: Negative tinel sign bilateral.   Psychiatric:         Behavior: Behavior normal.               Assessment:       Encounter Diagnoses   Name Primary?    Acquired deformity of toenail     Onychogryposis of toenail Yes         Plan:       Paige was seen today for toe injury.    Diagnoses and all orders for this visit:    Onychogryposis of toenail    Acquired deformity of toenail  -     Ambulatory referral/consult to Podiatry      I counseled the patient on her conditions, their implications and medical management.    Discussed that the nail deformity may well be just pressure induced from running and in the shoe however there is also a chance that there is fungus in the nail    Discussed treatment options for fungal toenails to include topical vs oral vs laser vs combined therapy in detail.      Declined fungal nail treatments due to efficacy versus cost and side effects    Can consider removing the nail if needed    Return FINESSE Azul DPM

## 2023-11-08 ENCOUNTER — OFFICE VISIT (OUTPATIENT)
Dept: PSYCHIATRY | Facility: CLINIC | Age: 38
End: 2023-11-08
Payer: OTHER GOVERNMENT

## 2023-11-08 VITALS
WEIGHT: 140 LBS | HEART RATE: 68 BPM | HEIGHT: 62 IN | DIASTOLIC BLOOD PRESSURE: 69 MMHG | SYSTOLIC BLOOD PRESSURE: 109 MMHG | BODY MASS INDEX: 25.76 KG/M2

## 2023-11-08 DIAGNOSIS — F41.8 OTHER SPECIFIED ANXIETY DISORDERS: Primary | ICD-10-CM

## 2023-11-08 PROCEDURE — 90833 PR PSYCHOTHERAPY W/PATIENT W/E&M, 30 MIN (ADD ON): ICD-10-PCS | Mod: ,,, | Performed by: PSYCHOLOGIST

## 2023-11-08 PROCEDURE — 90833 PSYTX W PT W E/M 30 MIN: CPT | Mod: ,,, | Performed by: PSYCHOLOGIST

## 2023-11-08 PROCEDURE — 99213 OFFICE O/P EST LOW 20 MIN: CPT | Mod: PBBFAC,PO | Performed by: PSYCHOLOGIST

## 2023-11-08 PROCEDURE — 99214 OFFICE O/P EST MOD 30 MIN: CPT | Mod: S$PBB,,, | Performed by: PSYCHOLOGIST

## 2023-11-08 PROCEDURE — 99999 PR PBB SHADOW E&M-EST. PATIENT-LVL III: ICD-10-PCS | Mod: PBBFAC,,, | Performed by: PSYCHOLOGIST

## 2023-11-08 PROCEDURE — 99214 PR OFFICE/OUTPT VISIT, EST, LEVL IV, 30-39 MIN: ICD-10-PCS | Mod: S$PBB,,, | Performed by: PSYCHOLOGIST

## 2023-11-08 PROCEDURE — 99999 PR PBB SHADOW E&M-EST. PATIENT-LVL III: CPT | Mod: PBBFAC,,, | Performed by: PSYCHOLOGIST

## 2023-11-08 RX ORDER — VENLAFAXINE HYDROCHLORIDE 75 MG/1
75 CAPSULE, EXTENDED RELEASE ORAL DAILY
Qty: 90 CAPSULE | Refills: 1 | Status: SHIPPED | OUTPATIENT
Start: 2023-11-08 | End: 2023-12-05 | Stop reason: SDUPTHER

## 2023-11-08 NOTE — PROGRESS NOTES
Outpatient Psychiatry Follow-Up Visit    Clinical Status of Patient: Outpatient (Ambulatory)  11/08/2023     Chief Complaint: 38 year old female presenting today for a follow-up.       Interval History and Content of Current Session:  Interim Events/Subjective Report/Content of Current Session:  follow-up appointment.    Pt is a 38 year old female with past psychiatric hx of social anxiety who presents for follow-up treatment. Pt feels significantly better on Effexor. Pt is calm and less anxious in meetings. Pt said that she can focus better, as well. Pt denied any major changes or new stressors.    Past Psychiatric hx: Wellbutrin (four days); Lexapro (2 days)    Past Medical hx:   Past Medical History:   Diagnosis Date    Migraine aura without headache     Migraine headache     TMJ arthritis         Interim hx:  Medication changes last visit:   started Effexor and titrated up to 75mg Q D  Anxiety: mild - improved  Depression: pt denied     Denies suicidal/homicidal ideations.  Denies hopelessness/worthlessness.    Denies auditory/visual hallucinations      Alcohol: minimal, infrequent  Drug: pt denied  Caffeine: minimal use  Tobacco: pt denied      Review of Systems   PSYCHIATRIC: Pertinent items are noted in the narrative.        CONSTITUTIONAL: weight stable    Past Medical, Family and Social History: The patient's past medical, family and social history have been reviewed and updated as appropriate within the electronic medical record. See encounter notes.     Current Psychiatric Medication:  Effexor XR 75mg Q D and Propranolol 10mg TID PRN     Compliance: yes      Side effects: pt denied     Risk Parameters:  Patient reports no suicidal ideation  Patient reports no homicidal ideation  Patient reports no self-injurious behavior  Patient reports no violent behavior     Exam (detailed: at least 9 elements; comprehensive: all 15 elements)   Constitutional  Vitals:  Most recent vital signs, dated less than 90 days  prior to this appointment, were reviewed. Pulse:  [68]   BP: (109)/(69)       General:  unremarkable, age appropriate, casual attire, good eye contact, good rapport       Musculoskeletal  Muscle Strength/Tone:  no flaccidity, no tremor    Gait & Station:  normal      Psychiatric                       Speech:  normal tone, normal rate, rhythm, and volume   Mood & Affect:   Depressed, anxious         Thought Process:   Goal directed; Linear    Associations:   intact   Thought Content:   No SI/HI, delusions, or paranoia, no AV/VH   Insight & Judgement:   Good, adequate to circumstances   Orientation:   grossly intact; alert and oriented x 4    Memory:  intact for content of interview    Language:  grossly intact, can repeat    Attention Span  : Grossly intact for content of interview   Fund of Knowledge:   intact and appropriate to age and level of education        Assessment and Diagnosis   Status/Progress: increased distress     Impression: Pt comes to be assessed for ADHD. Pt does not appear to meet criteria for ADHD but is struggling with anxiety that may be affecting her productivity and concentration. Will start SSRI treatment.     Diagnosis(es):   1) Anxiety Disorder, other    Intervention/Counseling/Treatment Plan   Medication Management:      1. Continue Effexor XR 75mg Q D and Propranolol 10mg TID PRN     2. Continue exercise regimen    3. Will defer counseling for now     4. Call to report any worsening of symptoms or problems with the medication. Pt instructed to go to ER with thoughts of harming self, others    Psychotherapy:   Target symptoms: anxiety  Why chosen therapy is appropriate versus another modality: CBT used; relevant to diagnosis, patient responds to this modality  Outcome monitoring methods: self-report, observation  Therapeutic intervention type: Cognitive Behavioral Therapy  Topics discussed/themes: building skills sets for symptom management, symptom recognition, nutrition, exercise  The  patient's response to the intervention is good  Patient's response to treatment is: good.   The patient's progress toward treatment goals: improving  16 minutes spent with pt in psychotherapy and 5 minutes in medication management     Return to clinic: 6 months or earlier PRN    -Cognitive-Behavioral/Supportive therapy and psychoeducation provided  -R/B/SE's of medications discussed with the pt who expresses understanding and chooses to take medications as prescribed.   -Pt instructed to call clinic, 911 or go to nearest emergency room if sxs worsen or pt is in   crisis. The pt expresses understanding.    Eder Lee, PhD, MP

## 2023-12-04 ENCOUNTER — PATIENT MESSAGE (OUTPATIENT)
Dept: PSYCHIATRY | Facility: CLINIC | Age: 38
End: 2023-12-04
Payer: OTHER GOVERNMENT

## 2023-12-05 RX ORDER — VENLAFAXINE HYDROCHLORIDE 75 MG/1
75 CAPSULE, EXTENDED RELEASE ORAL DAILY
Qty: 90 CAPSULE | Refills: 1 | Status: SHIPPED | OUTPATIENT
Start: 2023-12-05 | End: 2024-12-04

## 2024-01-26 ENCOUNTER — OFFICE VISIT (OUTPATIENT)
Dept: FAMILY MEDICINE | Facility: CLINIC | Age: 39
End: 2024-01-26
Payer: OTHER GOVERNMENT

## 2024-01-26 VITALS
DIASTOLIC BLOOD PRESSURE: 68 MMHG | TEMPERATURE: 99 F | BODY MASS INDEX: 26.77 KG/M2 | OXYGEN SATURATION: 98 % | SYSTOLIC BLOOD PRESSURE: 110 MMHG | WEIGHT: 146.38 LBS | HEART RATE: 61 BPM

## 2024-01-26 DIAGNOSIS — R09.81 COMPLAINT OF NASAL CONGESTION: ICD-10-CM

## 2024-01-26 DIAGNOSIS — U07.1 COVID-19: Primary | ICD-10-CM

## 2024-01-26 LAB
CTP QC/QA: YES
CTP QC/QA: YES
POC MOLECULAR INFLUENZA A AGN: NEGATIVE
POC MOLECULAR INFLUENZA B AGN: NEGATIVE
SARS-COV-2 RDRP RESP QL NAA+PROBE: POSITIVE

## 2024-01-26 PROCEDURE — 99213 OFFICE O/P EST LOW 20 MIN: CPT | Mod: PBBFAC,PO

## 2024-01-26 PROCEDURE — 99999 PR PBB SHADOW E&M-EST. PATIENT-LVL III: CPT | Mod: PBBFAC,,,

## 2024-01-26 PROCEDURE — 99999PBSHW POCT INFLUENZA A/B MOLECULAR: Mod: PBBFAC,,,

## 2024-01-26 PROCEDURE — 99213 OFFICE O/P EST LOW 20 MIN: CPT | Mod: S$PBB,,,

## 2024-01-26 PROCEDURE — 87502 INFLUENZA DNA AMP PROBE: CPT | Mod: PBBFAC,PO

## 2024-01-26 PROCEDURE — 99999PBSHW: Mod: PBBFAC,,,

## 2024-01-26 PROCEDURE — 87635 SARS-COV-2 COVID-19 AMP PRB: CPT | Mod: PBBFAC,PO

## 2024-01-26 RX ORDER — FLUTICASONE PROPIONATE 50 MCG
1 SPRAY, SUSPENSION (ML) NASAL DAILY
Qty: 9.9 ML | Refills: 0 | Status: SHIPPED | OUTPATIENT
Start: 2024-01-26

## 2024-01-26 RX ORDER — CETIRIZINE HYDROCHLORIDE 10 MG/1
10 TABLET ORAL DAILY
Qty: 30 TABLET | Refills: 0 | Status: SHIPPED | OUTPATIENT
Start: 2024-01-26 | End: 2024-02-25

## 2024-01-26 NOTE — PROGRESS NOTES
Name: Paige Barros  MRN: [id number]  : 1985  PCP: Megan Sierra MD    HPI    Patient follows with Dr. Sierra, new to me. Presents for covid like symptoms that started on Wednesday. Reports headaches, congestion, sneezing and non productive cough. Reports mild sore throat and runny nose. Denies any fever, chest pain or SOB. Reports  tested positive earlier this week. Took at home test this morning which was positive.     Review of Systems   Constitutional:  Negative for fever.   HENT:  Positive for congestion, sinus pressure and sore throat.    Respiratory:  Positive for cough. Negative for shortness of breath.    Cardiovascular:  Negative for chest pain and palpitations.   Musculoskeletal:  Negative for myalgias.   Neurological:  Positive for headaches.       Patient Active Problem List   Diagnosis    Migraine headache    IUD contraception- mirena placed 2018,  lot#[id number]    Shoulder weakness    Back stiffness    Other specified anxiety disorders       Vitals:    24 1012   BP: 110/68   Pulse: 61   Temp: 98.5 °F (36.9 °C)       Physical Exam  Constitutional:       General: She is not in acute distress.     Appearance: She is well-developed.   HENT:      Head: Normocephalic and atraumatic.      Right Ear: External ear normal.      Left Ear: External ear normal.      Nose: Congestion present.   Eyes:      Conjunctiva/sclera: Conjunctivae normal.      Pupils: Pupils are equal, round, and reactive to light.   Neck:      Thyroid: No thyromegaly.   Cardiovascular:      Rate and Rhythm: Normal rate and regular rhythm.      Pulses: Normal pulses.      Heart sounds: Normal heart sounds, S1 normal and S2 normal.   Pulmonary:      Effort: Pulmonary effort is normal. No respiratory distress.      Breath sounds: Normal breath sounds.   Chest:      Chest wall: No tenderness.   Musculoskeletal:         General: No swelling or tenderness. Normal range of motion.      Cervical back: Normal range of  motion and neck supple.   Skin:     General: Skin is warm and dry.      Coloration: Skin is not jaundiced or pale.   Neurological:      General: No focal deficit present.      Mental Status: She is alert and oriented to person, place, and time.      Cranial Nerves: No cranial nerve deficit.   Psychiatric:         Mood and Affect: Mood normal.         Behavior: Behavior normal.         1. COVID-19  -     POCT COVID-19 Rapid Screening. Covid Positive  -     POCT Influenza A/B Molecular  -     nirmatrelvir-ritonavir 300 mg (150 mg x 2)-100 mg copackaged tablets (EUA); Take 3 tablets by mouth 2 (two) times daily for 5 days. Each dose contains 2 nirmatrelvir (pink tablets) and 1 ritonavir (white tablet). Take all 3 tablets together  Dispense: 30 tablet; Refill: 0    2. Complaint of nasal congestion  -     cetirizine (ZYRTEC) 10 MG tablet; Take 1 tablet (10 mg total) by mouth once daily.  Dispense: 30 tablet; Refill: 0  -     fluticasone propionate (FLONASE) 50 mcg/actuation nasal spray; 1 spray (50 mcg total) by Each Nostril route once daily.  Dispense: 9.9 mL; Refill: 0        Follow up as needed or if symptoms fail to improve      LILY Hawkins  01/26/2024

## 2024-03-02 DIAGNOSIS — R10.13 EPIGASTRIC PAIN: ICD-10-CM

## 2024-03-04 RX ORDER — PANTOPRAZOLE SODIUM 20 MG/1
20 TABLET, DELAYED RELEASE ORAL DAILY
Qty: 90 TABLET | Refills: 1 | Status: SHIPPED | OUTPATIENT
Start: 2024-03-04 | End: 2024-08-31

## 2024-04-11 ENCOUNTER — PATIENT MESSAGE (OUTPATIENT)
Dept: ADMINISTRATIVE | Facility: HOSPITAL | Age: 39
End: 2024-04-11
Payer: OTHER GOVERNMENT

## 2024-05-17 ENCOUNTER — OFFICE VISIT (OUTPATIENT)
Dept: PSYCHIATRY | Facility: CLINIC | Age: 39
End: 2024-05-17
Payer: OTHER GOVERNMENT

## 2024-05-17 DIAGNOSIS — F41.8 OTHER SPECIFIED ANXIETY DISORDERS: Primary | ICD-10-CM

## 2024-05-17 DIAGNOSIS — F41.8 SITUATIONAL ANXIETY: ICD-10-CM

## 2024-05-17 PROCEDURE — 99213 OFFICE O/P EST LOW 20 MIN: CPT | Mod: 95,,, | Performed by: PSYCHOLOGIST

## 2024-05-17 RX ORDER — PROPRANOLOL HYDROCHLORIDE 10 MG/1
10-20 TABLET ORAL 3 TIMES DAILY PRN
Qty: 180 TABLET | Refills: 0 | Status: SHIPPED | OUTPATIENT
Start: 2024-05-17 | End: 2025-05-17

## 2024-05-17 RX ORDER — VENLAFAXINE HYDROCHLORIDE 75 MG/1
75 CAPSULE, EXTENDED RELEASE ORAL DAILY
Qty: 90 CAPSULE | Refills: 3 | Status: SHIPPED | OUTPATIENT
Start: 2024-05-17 | End: 2025-05-17

## 2024-05-17 NOTE — PROGRESS NOTES
Outpatient Psychiatry Follow-Up Visit    Clinical Status of Patient: Outpatient (Ambulatory)  05/17/2024     Chief Complaint: 39 year old female presenting today for a follow-up.       Interval History and Content of Current Session:  Interim Events/Subjective Report/Content of Current Session:  follow-up appointment.    Pt is a 39 year old female with past psychiatric hx of social anxiety who presents for follow-up treatment. Pt reports good, stable continued efficacy of Effexor with infrequent Propranolol PRN use. Pt denied any life changes. Pt would like to stay on her medication long-term. Pt referred back to PCP for refills. Pt denied any major changes or new stressors.    Past Psychiatric hx: Wellbutrin (four days); Lexapro (2 days)    Past Medical hx:   Past Medical History:   Diagnosis Date    Migraine aura without headache     Migraine headache     TMJ arthritis         Interim hx:  Medication changes last visit:   none  Anxiety: mild - improved  Depression: pt denied     Denies suicidal/homicidal ideations.  Denies hopelessness/worthlessness.    Denies auditory/visual hallucinations      Alcohol: minimal, infrequent  Drug: pt denied  Caffeine: minimal use  Tobacco: pt denied      Review of Systems   PSYCHIATRIC: Pertinent items are noted in the narrative.        CONSTITUTIONAL: weight stable    Past Medical, Family and Social History: The patient's past medical, family and social history have been reviewed and updated as appropriate within the electronic medical record. See encounter notes.     Current Psychiatric Medication:  Effexor XR 75mg Q D and Propranolol 10mg TID PRN     Compliance: yes      Side effects: pt denied     Risk Parameters:  Patient reports no suicidal ideation  Patient reports no homicidal ideation  Patient reports no self-injurious behavior  Patient reports no violent behavior     Exam (detailed: at least 9 elements; comprehensive: all 15 elements)   Constitutional  Vitals:  Most  recent vital signs, dated less than 90 days prior to this appointment, were reviewed.        General:  unremarkable, age appropriate, casual attire, good eye contact, good rapport       Musculoskeletal  Muscle Strength/Tone:  no flaccidity, no tremor    Gait & Station:  normal      Psychiatric                       Speech:  normal tone, normal rate, rhythm, and volume   Mood & Affect:   Depressed, anxious         Thought Process:   Goal directed; Linear    Associations:   intact   Thought Content:   No SI/HI, delusions, or paranoia, no AV/VH   Insight & Judgement:   Good, adequate to circumstances   Orientation:   grossly intact; alert and oriented x 4    Memory:  intact for content of interview    Language:  grossly intact, can repeat    Attention Span  : Grossly intact for content of interview   Fund of Knowledge:   intact and appropriate to age and level of education        Assessment and Diagnosis   Status/Progress: stable     Impression: Pt comes to be assessed for ADHD. Pt does not appear to meet criteria for ADHD but is struggling with anxiety that may be affecting her productivity and concentration. Will start SSRI treatment.     Diagnosis(es):   1) Anxiety Disorder, other    Intervention/Counseling/Treatment Plan   Medication Management:      1. Continue Effexor XR 75mg Q D and Propranolol 10mg TID PRN     2. Continue exercise regimen    3. Will defer counseling for now     4. Call to report any worsening of symptoms or problems with the medication. Pt instructed to go to ER with thoughts of harming self, others    Psychotherapy:   Target symptoms: anxiety  Why chosen therapy is appropriate versus another modality: CBT used; relevant to diagnosis, patient responds to this modality  Outcome monitoring methods: self-report, observation  Therapeutic intervention type: Cognitive Behavioral Therapy  Topics discussed/themes: building skills sets for symptom management, symptom recognition, nutrition,  exercise  The patient's response to the intervention is good  Patient's response to treatment is: good.   The patient's progress toward treatment goals: stable     Return to clinic: PRN    -Cognitive-Behavioral/Supportive therapy and psychoeducation provided  -R/B/SE's of medications discussed with the pt who expresses understanding and chooses to take medications as prescribed.   -Pt instructed to call clinic, 911 or go to nearest emergency room if sxs worsen or pt is in   crisis. The pt expresses understanding.    Eder Lee, PhD, MP

## 2024-05-22 DIAGNOSIS — Z12.31 OTHER SCREENING MAMMOGRAM: ICD-10-CM

## 2024-06-10 ENCOUNTER — HOSPITAL ENCOUNTER (OUTPATIENT)
Dept: RADIOLOGY | Facility: HOSPITAL | Age: 39
Discharge: HOME | End: 2024-06-10
Attending: INTERNAL MEDICINE
Payer: OTHER GOVERNMENT

## 2024-06-10 DIAGNOSIS — Z12.31 OTHER SCREENING MAMMOGRAM: ICD-10-CM

## 2024-06-10 PROCEDURE — 77063 BREAST TOMOSYNTHESIS BI: CPT | Mod: 26,,, | Performed by: RADIOLOGY

## 2024-06-10 PROCEDURE — 77067 SCR MAMMO BI INCL CAD: CPT | Mod: TC,PO

## 2024-06-10 PROCEDURE — 77067 SCR MAMMO BI INCL CAD: CPT | Mod: 26,,, | Performed by: RADIOLOGY

## 2024-06-11 ENCOUNTER — LAB VISIT (OUTPATIENT)
Dept: LAB | Facility: HOSPITAL | Age: 39
End: 2024-06-11
Attending: NURSE PRACTITIONER
Payer: OTHER GOVERNMENT

## 2024-06-11 ENCOUNTER — OFFICE VISIT (OUTPATIENT)
Dept: FAMILY MEDICINE | Facility: CLINIC | Age: 39
End: 2024-06-11
Payer: OTHER GOVERNMENT

## 2024-06-11 VITALS
WEIGHT: 144.06 LBS | DIASTOLIC BLOOD PRESSURE: 82 MMHG | RESPIRATION RATE: 18 BRPM | BODY MASS INDEX: 26.51 KG/M2 | HEART RATE: 70 BPM | HEIGHT: 62 IN | SYSTOLIC BLOOD PRESSURE: 122 MMHG

## 2024-06-11 DIAGNOSIS — T85.43XS BREAST IMPLANT RUPTURE, SEQUELA: ICD-10-CM

## 2024-06-11 DIAGNOSIS — Z01.818 PREOP EXAMINATION: Primary | ICD-10-CM

## 2024-06-11 DIAGNOSIS — Z01.818 PREOP EXAMINATION: ICD-10-CM

## 2024-06-11 LAB
ALBUMIN SERPL BCP-MCNC: 4.1 G/DL (ref 3.5–5.2)
ALP SERPL-CCNC: 43 U/L (ref 55–135)
ALT SERPL W/O P-5'-P-CCNC: 12 U/L (ref 10–44)
ANION GAP SERPL CALC-SCNC: 11 MMOL/L (ref 8–16)
AST SERPL-CCNC: 19 U/L (ref 10–40)
BASOPHILS # BLD AUTO: 0.05 K/UL (ref 0–0.2)
BASOPHILS NFR BLD: 0.6 % (ref 0–1.9)
BILIRUB SERPL-MCNC: 0.4 MG/DL (ref 0.1–1)
BUN SERPL-MCNC: 15 MG/DL (ref 6–20)
CALCIUM SERPL-MCNC: 9.5 MG/DL (ref 8.7–10.5)
CHLORIDE SERPL-SCNC: 106 MMOL/L (ref 95–110)
CO2 SERPL-SCNC: 24 MMOL/L (ref 23–29)
CREAT SERPL-MCNC: 1 MG/DL (ref 0.5–1.4)
DIFFERENTIAL METHOD BLD: ABNORMAL
EOSINOPHIL # BLD AUTO: 0.1 K/UL (ref 0–0.5)
EOSINOPHIL NFR BLD: 1.6 % (ref 0–8)
ERYTHROCYTE [DISTWIDTH] IN BLOOD BY AUTOMATED COUNT: 12.6 % (ref 11.5–14.5)
EST. GFR  (NO RACE VARIABLE): >60 ML/MIN/1.73 M^2
GLUCOSE SERPL-MCNC: 82 MG/DL (ref 70–110)
HCT VFR BLD AUTO: 39.7 % (ref 37–48.5)
HGB BLD-MCNC: 13.4 G/DL (ref 12–16)
IMM GRANULOCYTES # BLD AUTO: 0.02 K/UL (ref 0–0.04)
IMM GRANULOCYTES NFR BLD AUTO: 0.2 % (ref 0–0.5)
LYMPHOCYTES # BLD AUTO: 3.1 K/UL (ref 1–4.8)
LYMPHOCYTES NFR BLD: 38.9 % (ref 18–48)
MCH RBC QN AUTO: 31.5 PG (ref 27–31)
MCHC RBC AUTO-ENTMCNC: 33.8 G/DL (ref 32–36)
MCV RBC AUTO: 93 FL (ref 82–98)
MONOCYTES # BLD AUTO: 0.5 K/UL (ref 0.3–1)
MONOCYTES NFR BLD: 5.7 % (ref 4–15)
NEUTROPHILS # BLD AUTO: 4.3 K/UL (ref 1.8–7.7)
NEUTROPHILS NFR BLD: 53 % (ref 38–73)
NRBC BLD-RTO: 0 /100 WBC
PLATELET # BLD AUTO: 226 K/UL (ref 150–450)
PMV BLD AUTO: 10.6 FL (ref 9.2–12.9)
POTASSIUM SERPL-SCNC: 4.1 MMOL/L (ref 3.5–5.1)
PROT SERPL-MCNC: 6.6 G/DL (ref 6–8.4)
RBC # BLD AUTO: 4.26 M/UL (ref 4–5.4)
SODIUM SERPL-SCNC: 141 MMOL/L (ref 136–145)
WBC # BLD AUTO: 8.03 K/UL (ref 3.9–12.7)

## 2024-06-11 PROCEDURE — 99999 PR PBB SHADOW E&M-EST. PATIENT-LVL III: CPT | Mod: PBBFAC,,, | Performed by: NURSE PRACTITIONER

## 2024-06-11 PROCEDURE — 93005 ELECTROCARDIOGRAM TRACING: CPT | Mod: PBBFAC,PN | Performed by: INTERNAL MEDICINE

## 2024-06-11 PROCEDURE — 85025 COMPLETE CBC W/AUTO DIFF WBC: CPT | Performed by: NURSE PRACTITIONER

## 2024-06-11 PROCEDURE — 36415 COLL VENOUS BLD VENIPUNCTURE: CPT | Mod: PN | Performed by: NURSE PRACTITIONER

## 2024-06-11 PROCEDURE — 93010 ELECTROCARDIOGRAM REPORT: CPT | Mod: S$PBB,,, | Performed by: INTERNAL MEDICINE

## 2024-06-11 PROCEDURE — 80053 COMPREHEN METABOLIC PANEL: CPT | Performed by: NURSE PRACTITIONER

## 2024-06-11 PROCEDURE — 99213 OFFICE O/P EST LOW 20 MIN: CPT | Mod: PBBFAC,PN | Performed by: NURSE PRACTITIONER

## 2024-06-11 PROCEDURE — 99214 OFFICE O/P EST MOD 30 MIN: CPT | Mod: S$PBB,,, | Performed by: NURSE PRACTITIONER

## 2024-06-11 NOTE — PROGRESS NOTES
THIS DOCUMENT WAS MADE IN PART WITH VOICE RECOGNITION SOFTWARE.  OCCASIONALLY THIS SOFTWARE WILL MISINTERPRET WORDS OR PHRASES.        Patient ID: Paige Barros is a 39 y.o. female.    Chief Complaint: Pre-op Exam (Breast augmentation/)      Paige Barros is in the office for preop exam.    Patient reports she will be having bilateral breast augmentation performed by Dr. Connors on 6/27/24. Pt reports that she has had surgical procedures before, with no complications or adverse reactions to anesthesia.         Past Medical History:   Diagnosis Date    Migraine aura without headache     Migraine headache     TMJ arthritis                 Current Outpatient Medications:     levonorgestrel (MIRENA) 20 mcg/24 hr (5 years) IUD, 1 each by Intrauterine route once., Disp: , Rfl:     propranoloL (INDERAL) 10 MG tablet, Take 1-2 tablets (10-20 mg total) by mouth 3 (three) times daily as needed (anxiety)., Disp: 180 tablet, Rfl: 0    venlafaxine (EFFEXOR XR) 75 MG 24 hr capsule, Take 1 capsule (75 mg total) by mouth once daily., Disp: 90 capsule, Rfl: 3    azelastine (ASTELIN) 137 mcg (0.1 %) nasal spray, 1 spray (137 mcg total) by Nasal route 2 (two) times daily. (Patient not taking: Reported on 1/26/2024), Disp: 30 mL, Rfl: 0    cetirizine (ZYRTEC) 10 MG tablet, Take 1 tablet (10 mg total) by mouth once daily., Disp: 30 tablet, Rfl: 0    eletriptan (RELPAX) 40 MG tablet, Take 1 tablet (40 mg total) by mouth as needed. may repeat in 2 hours if necessary, Disp: 30 tablet, Rfl: 1    fluticasone propionate (FLONASE) 50 mcg/actuation nasal spray, 1 spray (50 mcg total) by Each Nostril route once daily. (Patient not taking: Reported on 6/11/2024), Disp: 9.9 mL, Rfl: 0    pantoprazole (PROTONIX) 20 MG tablet, Take 1 tablet (20 mg total) by mouth once daily. (Patient not taking: Reported on 6/11/2024), Disp: 90 tablet, Rfl: 1    The 10-year ASCVD risk score (Pooja ESCAMILLA, et al., 2019) is: 0.5%    Values used to calculate the  score:      Age: 39 years      Sex: Female      Is Non- : No      Diabetic: No      Tobacco smoker: No      Systolic Blood Pressure: 122 mmHg      Is BP treated: No      HDL Cholesterol: 61 mg/dL      Total Cholesterol: 206 mg/dL     Wt Readings from Last 3 Encounters:   06/11/24 65.4 kg (144 lb 1.1 oz)   01/26/24 66.4 kg (146 lb 6.2 oz)   09/27/23 67.6 kg (149 lb)     Temp Readings from Last 3 Encounters:   01/26/24 98.5 °F (36.9 °C)   08/14/23 97.6 °F (36.4 °C) (Oral)   03/22/21 97 °F (36.1 °C) (Skin)     BP Readings from Last 3 Encounters:   06/11/24 122/82   01/26/24 110/68   08/15/23 112/80     Pulse Readings from Last 3 Encounters:   06/11/24 70   01/26/24 61   08/15/23 65     Resp Readings from Last 3 Encounters:   06/11/24 18   08/14/23 20   12/08/22 14     PF Readings from Last 3 Encounters:   09/22/17 98 L/min     SpO2 Readings from Last 3 Encounters:   01/26/24 98%   08/15/23 97%   08/14/23 100%        Lab Results   Component Value Date    HGBA1C 5.0 07/13/2023     Lab Results   Component Value Date    LDLCALC 116.0 07/13/2023    CREATININE 0.82 08/14/2023       Review of Systems   Constitutional:  Negative for fatigue and unexpected weight change.   HENT:  Negative for congestion, postnasal drip and rhinorrhea.    Respiratory:  Negative for cough and shortness of breath.    Gastrointestinal:  Negative for constipation, diarrhea, nausea and vomiting.   Genitourinary:  Negative for difficulty urinating and dysuria.   Musculoskeletal:  Negative for arthralgias and back pain.   Neurological:  Negative for dizziness and headaches.           Objective:      Physical Exam  Vitals and nursing note reviewed.   Constitutional:       General: She is not in acute distress.     Appearance: Normal appearance.   HENT:      Head: Normocephalic and atraumatic.      Nose: Nose normal.      Mouth/Throat:      Mouth: Mucous membranes are moist.      Pharynx: Oropharynx is clear.   Eyes:       Conjunctiva/sclera: Conjunctivae normal.      Pupils: Pupils are equal, round, and reactive to light.   Cardiovascular:      Rate and Rhythm: Normal rate and regular rhythm.      Heart sounds: Normal heart sounds.   Pulmonary:      Effort: Pulmonary effort is normal. No respiratory distress.      Breath sounds: Normal breath sounds.   Abdominal:      General: Bowel sounds are normal. There is no distension.      Palpations: Abdomen is soft.      Tenderness: There is no abdominal tenderness.   Musculoskeletal:         General: Normal range of motion.      Cervical back: Normal range of motion and neck supple.   Skin:     General: Skin is warm and dry.   Neurological:      General: No focal deficit present.      Mental Status: She is alert and oriented to person, place, and time.   Psychiatric:         Mood and Affect: Mood normal.         Behavior: Behavior normal.         Thought Content: Thought content normal.             Screening recommendations appropriate to age and health status were reviewed.    Preop examination  -     CBC Auto Differential; Future; Expected date: 06/11/2024  -     Comprehensive Metabolic Panel; Future; Expected date: 06/11/2024  -     IN OFFICE EKG 12-LEAD (to Muse)    Breast implant rupture, sequela        RCRI risk factors include: no known RCRI risk factors. As such, per RCRI the risk of cardiac death, nonfatal myocardial infarction, or nonfatal cardiac arrest is 0.4% and the risk of myocardial infarction, pulmonary edema, ventricular fibrillation, primary cardiac arrest, or complete heart block is 0.5%.  Overall this patient can be considered low risk for this intermediate risk procedure. No further cardiac testing is recommended at this time.     Patient denies any symptoms (as per HPI) concerning for undiagnosed lung disease including KEN. Would not recommend obtaining chest X-ray, sleep study, or PFTs at this time. Patient is a non-smoker. We discussed the benefits of early  mobilization and deep breathing after surgery.      Screened patient for alcohol misuse, use of illicit drugs, and personal or family history of anesthetic complications or bleeding diathesis and no substantial concerns were identified.     All current medications were reviewed and at this time no changes to medications are recommended prior to surgery.     I recommend use of standard pre-op and post-op precautions for this patient. In my opinion, she is medically optimized for this procedure, and can proceed without further evaluation.

## 2024-06-12 LAB
OHS QRS DURATION: 88 MS
OHS QTC CALCULATION: 428 MS

## 2024-06-30 DIAGNOSIS — F41.8 SITUATIONAL ANXIETY: ICD-10-CM

## 2024-07-01 RX ORDER — PROPRANOLOL HYDROCHLORIDE 10 MG/1
TABLET ORAL
Qty: 180 TABLET | Refills: 0 | Status: SHIPPED | OUTPATIENT
Start: 2024-07-01

## 2024-09-30 ENCOUNTER — PATIENT MESSAGE (OUTPATIENT)
Dept: ADMINISTRATIVE | Facility: OTHER | Age: 39
End: 2024-09-30
Payer: OTHER GOVERNMENT

## 2024-10-07 DIAGNOSIS — R09.81 COMPLAINT OF NASAL CONGESTION: ICD-10-CM

## 2024-10-07 RX ORDER — ELETRIPTAN HYDROBROMIDE 40 MG/1
40 TABLET, FILM COATED ORAL
Qty: 30 TABLET | Refills: 1 | Status: SHIPPED | OUTPATIENT
Start: 2024-10-07 | End: 2024-11-06

## 2024-10-07 NOTE — TELEPHONE ENCOUNTER
No care due was identified.  [facility] Embedded Care Due Messages. Reference number: [id number].   10/07/2024 6:23:22 PM CDT

## 2024-10-08 RX ORDER — VENLAFAXINE HYDROCHLORIDE 75 MG/1
75 CAPSULE, EXTENDED RELEASE ORAL DAILY
Qty: 90 CAPSULE | Refills: 3 | Status: SHIPPED | OUTPATIENT
Start: 2024-10-08 | End: 2025-10-08

## 2024-10-08 RX ORDER — FLUTICASONE PROPIONATE 50 MCG
1 SPRAY, SUSPENSION (ML) NASAL DAILY
Qty: 48 ML | Refills: 0 | Status: SHIPPED | OUTPATIENT
Start: 2024-10-08

## 2024-10-08 NOTE — TELEPHONE ENCOUNTER
Refill Routing Note   Medication(s) are not appropriate for processing by [facility] for the following reason(s):        ED/Hospital Visit since last OV with provider  Patient not seen by provider within 15 months  No active prescription written by provider    [facility] action(s):  Defer               Appointments  past 12m or future 3m with PCP    Date Provider   Last Visit   7/14/2023 Megan Sierra MD   Next Visit   Visit date not found Megan Sierra MD   ED visits in past 90 days: 0        Note composed:10:37 AM 10/08/2024

## 2024-10-08 NOTE — TELEPHONE ENCOUNTER
Please approve for fluticasone propionate (FLONASE) 50 mcg/actuation nasal spray     Last OV 06/11/24  Next appt --

## 2024-10-08 NOTE — TELEPHONE ENCOUNTER
No care due was identified.  [facility] Embedded Care Due Messages. Reference number: [id number].   10/08/2024 10:33:01 AM CDT

## 2024-11-13 ENCOUNTER — E-VISIT (OUTPATIENT)
Dept: FAMILY MEDICINE | Facility: CLINIC | Age: 39
End: 2024-11-13
Payer: OTHER GOVERNMENT

## 2024-11-13 DIAGNOSIS — L25.9 CONTACT DERMATITIS, UNSPECIFIED CONTACT DERMATITIS TYPE, UNSPECIFIED TRIGGER: Primary | ICD-10-CM

## 2024-11-13 RX ORDER — TRIAMCINOLONE ACETONIDE 1 MG/G
CREAM TOPICAL
Qty: 60 G | Refills: 0 | Status: SHIPPED | OUTPATIENT
Start: 2024-11-13

## 2024-11-13 RX ORDER — METHYLPREDNISOLONE 4 MG/1
TABLET ORAL
Qty: 21 TABLET | Refills: 0 | Status: SHIPPED | OUTPATIENT
Start: 2024-11-13 | End: 2024-11-15

## 2024-11-13 NOTE — PROGRESS NOTES
Patient ID: Paige Barros is a 39 y.o. female.    Chief Complaint: General Illness (Entered automatically based on patient selection in [facility].)          274}  The patient initiated a request through [facility] on 11/13/2024 for evaluation and management with a chief complaint of General Illness (Entered automatically based on patient selection in [facility].)     I evaluated the questionnaire submission on 11/13/2024 .    Total Time (in minutes): 11     [facility]-Skin Hair Nails    11/13/2024 12:22 PM CST - Filed by Patient   What do you need help with? Skin   What concern do you have about your skin? Rash   Do you agree to participate in an E-Visit? Yes   If you have any of the following symptoms, please present to your local emergency room or call 911:  I acknowledge   Select all that apply: None of the above   What is the main issue you would like addressed today? Poison oak rash   How would you describe your skin problem? Rash   When did your symptoms first appear? 11/8/2024   Where is it located?  Face;  Back;  Arm(s);  Leg(s)   Does it itch? Yes   Does it hurt? No   Is there discharge or drainage? No   Is there bleeding? No   Describe the character Raised   Describe the color Pink   Has it changed over time? Spread to other locations   Frequency of skin problem Always there   Duration of the skin problem (how long does it stay when it is present) Never goes away   I have had a new exposure to Poison ivy/poison oak   I have had a new exposure to Poison ivy/poison oak   What have you used to treat the skin problem? Topical itch cream   If you have used anything for treatment, has it helped the symptoms? No   Other generalized symptoms that you associate with the rash No other symptoms   Provide any additional information you feel is important. Itching, spreading rash   At least one photo is required for treatment to be provided. You can upload a maximum of three photos of the affected area.      Are you able to take your vital signs? No          Active Problem List with Overview Notes    Diagnosis Date Noted    Other specified anxiety disorders 08/10/2023    Back stiffness 07/05/2022    Shoulder weakness 09/22/2021    IUD contraception- mirena placed 7/11/2018,  lot#[id number] 07/11/2018    Migraine headache       Recent Labs Obtained:  Lab Results   Component Value Date    WBC 8.03 06/11/2024    HGB 13.4 06/11/2024    HCT 39.7 06/11/2024    MCV 93 06/11/2024     06/11/2024     06/11/2024    K 4.1 06/11/2024    GLU 82 06/11/2024    CREATININE 1.0 06/11/2024    EGFRNORACEVR >60.0 06/11/2024    HGBA1C 5.0 07/13/2023    TSH 2.226 07/13/2023      Review of patient's allergies indicates:  No Known Allergies    Encounter Diagnosis   Name Primary?    Contact dermatitis, unspecified contact dermatitis type, unspecified trigger Yes        No orders of the defined types were placed in this encounter.     Medications Ordered This Encounter   Medications    methylPREDNISolone (MEDROL DOSEPACK) 4 mg tablet     Sig: follow package directions     Dispense:  21 tablet     Refill:  0    triamcinolone acetonide 0.1% (KENALOG) 0.1 % cream     Sig: AAA bid     Dispense:  60 g     Refill:  0        E-Visit Time Tracking:    Day 1 Time (in minutes): 11    Total Time (in minutes): 11      274}

## 2024-11-14 RX ORDER — TRIAMCINOLONE ACETONIDE 40 MG/ML
40 INJECTION, SUSPENSION INTRA-ARTICULAR; INTRAMUSCULAR
Status: SHIPPED | OUTPATIENT
Start: 2024-11-14

## 2024-11-14 NOTE — ADDENDUM NOTE
Addended by: MICHELET VALENTINO on: 11/14/2024 06:48 AM     Modules accepted: Orders    
homicidal/psychiatric evaluation

## 2024-11-15 ENCOUNTER — OFFICE VISIT (OUTPATIENT)
Dept: FAMILY MEDICINE | Facility: CLINIC | Age: 39
End: 2024-11-15
Payer: OTHER GOVERNMENT

## 2024-11-15 VITALS
HEIGHT: 62 IN | BODY MASS INDEX: 28.07 KG/M2 | HEART RATE: 66 BPM | DIASTOLIC BLOOD PRESSURE: 89 MMHG | OXYGEN SATURATION: 97 % | SYSTOLIC BLOOD PRESSURE: 110 MMHG | WEIGHT: 152.56 LBS

## 2024-11-15 DIAGNOSIS — L23.7 POISON IVY DERMATITIS: Primary | ICD-10-CM

## 2024-11-15 PROCEDURE — 99214 OFFICE O/P EST MOD 30 MIN: CPT | Mod: PBBFAC,PO | Performed by: NURSE PRACTITIONER

## 2024-11-15 PROCEDURE — 99999 PR PBB SHADOW E&M-EST. PATIENT-LVL IV: CPT | Mod: PBBFAC,,, | Performed by: NURSE PRACTITIONER

## 2024-11-15 RX ORDER — PREDNISONE 10 MG/1
10 TABLET ORAL 2 TIMES DAILY
COMMUNITY
Start: 2024-10-19 | End: 2024-11-15

## 2024-11-15 RX ORDER — BETAMETHASONE SODIUM PHOSPHATE AND BETAMETHASONE ACETATE 3; 3 MG/ML; MG/ML
6 INJECTION, SUSPENSION INTRA-ARTICULAR; INTRALESIONAL; INTRAMUSCULAR; SOFT TISSUE
Status: COMPLETED | OUTPATIENT
Start: 2024-11-15 | End: 2024-11-15

## 2024-11-15 RX ORDER — PREDNISONE 10 MG/1
TABLET ORAL
Qty: 20 TABLET | Refills: 0 | Status: SHIPPED | OUTPATIENT
Start: 2024-11-15

## 2024-11-15 RX ADMIN — BETAMETHASONE SODIUM PHOSPHATE AND BETAMETHASONE ACETATE 6 MG: 3; 3 INJECTION, SUSPENSION INTRA-ARTICULAR; INTRALESIONAL; INTRAMUSCULAR; SOFT TISSUE at 10:11

## 2024-11-15 NOTE — PROGRESS NOTES
Subjective:       Patient ID: Paige Barros is a 39 y.o. female.    Chief Complaint: Poison [place]    HPI  Contact with poison ivy/oak around 11/3, rash shortly developed    E visit 11/13 prescrbied medrol dosepack and triamcinolone --no improvement, continues to spread, now on face    Vitals:    11/15/24 0927   BP: 110/89   Pulse: 66     Review of Systems   Skin:  Positive for rash.       Past Medical History:   Diagnosis Date    Migraine aura without headache     Migraine headache     TMJ arthritis      Objective:      Physical Exam  Constitutional:       General: She is not in acute distress.     Appearance: She is well-developed. She is not ill-appearing, toxic-appearing or diaphoretic.   HENT:      Right Ear: Hearing normal.      Left Ear: Hearing normal.   Pulmonary:      Effort: No tachypnea or respiratory distress.   Skin:     Coloration: Skin is not pale.   Neurological:      Mental Status: She is alert and oriented to person, place, and time.   Psychiatric:         Speech: Speech normal.         Behavior: Behavior normal.         Thought Content: Thought content normal.         Judgment: Judgment normal.         Assessment:       1. Poison ivy dermatitis        Plan:       Poison ivy dermatitis  -     betamethasone acetate-betamethasone sodium phosphate injection 6 mg  -     predniSONE (DELTASONE) 10 MG tablet; Take 4 tabs daily for 2 days, then 3 tabs daily for 2 days, then 2 tabs daily for 2 days, then 1 tab daily for 2 days, then stop  Dispense: 20 tablet; Refill: 0          education provided on supportive care, symptom monitoring and return precautions       Follow up for further evaluation if s/s worsen, fail to improve, or new symptoms arise.    Medication List with Changes/Refills   New Medications    PREDNISONE (DELTASONE) 10 MG TABLET    Take 4 tabs daily for 2 days, then 3 tabs daily for 2 days, then 2 tabs daily for 2 days, then 1 tab daily for 2 days, then stop   Current Medications     AZELASTINE (ASTELIN) 137 MCG (0.1 %) NASAL SPRAY    1 spray (137 mcg total) by Nasal route 2 (two) times daily.    CETIRIZINE (ZYRTEC) 10 MG TABLET    Take 1 tablet (10 mg total) by mouth once daily.    ELETRIPTAN (RELPAX) 40 MG TABLET    Take 1 tablet (40 mg total) by mouth as needed. may repeat in 2 hours if necessary    FLUTICASONE PROPIONATE (FLONASE) 50 MCG/ACTUATION NASAL SPRAY    1 spray (50 mcg total) by Each Nostril route once daily.    LEVONORGESTREL (MIRENA) 20 MCG/24 HR (5 YEARS) IUD    1 each by Intrauterine route once.    PANTOPRAZOLE (PROTONIX) 20 MG TABLET    Take 1 tablet (20 mg total) by mouth once daily.    PROPRANOLOL (INDERAL) 10 MG TABLET    TAKE 1 TO 2 TABLETS(10 TO 20 MG) BY MOUTH THREE TIMES DAILY AS NEEDED FOR ANXIETY    TRIAMCINOLONE ACETONIDE 0.1% (KENALOG) 0.1 % CREAM    AAA bid    VENLAFAXINE (EFFEXOR XR) 75 MG 24 HR CAPSULE    Take 1 capsule (75 mg total) by mouth once daily.   Discontinued Medications    METHYLPREDNISOLONE (MEDROL DOSEPACK) 4 MG TABLET    follow package directions    PREDNISONE (DELTASONE) 10 MG TABLET    Take 10 mg by mouth 2 (two) times daily.

## 2024-11-18 ENCOUNTER — PATIENT MESSAGE (OUTPATIENT)
Dept: FAMILY MEDICINE | Facility: CLINIC | Age: 39
End: 2024-11-18
Payer: OTHER GOVERNMENT

## 2025-03-17 ENCOUNTER — OFFICE VISIT (OUTPATIENT)
Dept: FAMILY MEDICINE | Facility: CLINIC | Age: 40
End: 2025-03-17
Payer: OTHER GOVERNMENT

## 2025-03-17 VITALS
SYSTOLIC BLOOD PRESSURE: 118 MMHG | DIASTOLIC BLOOD PRESSURE: 70 MMHG | BODY MASS INDEX: 29.17 KG/M2 | WEIGHT: 158.5 LBS | TEMPERATURE: 98 F | HEIGHT: 62 IN

## 2025-03-17 DIAGNOSIS — H61.21 IMPACTED CERUMEN OF RIGHT EAR: ICD-10-CM

## 2025-03-17 DIAGNOSIS — B96.89 ACUTE BACTERIAL SINUSITIS: Primary | ICD-10-CM

## 2025-03-17 DIAGNOSIS — J01.90 ACUTE BACTERIAL SINUSITIS: Primary | ICD-10-CM

## 2025-03-17 DIAGNOSIS — R09.81 SINUS CONGESTION: ICD-10-CM

## 2025-03-17 PROCEDURE — 99999 PR PBB SHADOW E&M-EST. PATIENT-LVL IV: CPT | Mod: PBBFAC,,, | Performed by: NURSE PRACTITIONER

## 2025-03-17 PROCEDURE — 99214 OFFICE O/P EST MOD 30 MIN: CPT | Mod: PBBFAC,PN | Performed by: NURSE PRACTITIONER

## 2025-03-17 PROCEDURE — 99214 OFFICE O/P EST MOD 30 MIN: CPT | Mod: S$PBB,,, | Performed by: NURSE PRACTITIONER

## 2025-03-17 RX ORDER — METHYLPREDNISOLONE 4 MG/1
TABLET ORAL
Qty: 21 EACH | Refills: 0 | Status: SHIPPED | OUTPATIENT
Start: 2025-03-17 | End: 2025-04-07

## 2025-03-17 RX ORDER — AMOXICILLIN AND CLAVULANATE POTASSIUM 875; 125 MG/1; MG/1
1 TABLET, FILM COATED ORAL EVERY 12 HOURS
Qty: 14 TABLET | Refills: 0 | Status: SHIPPED | OUTPATIENT
Start: 2025-03-17 | End: 2025-03-24

## 2025-03-17 NOTE — PROGRESS NOTES
THIS DOCUMENT WAS MADE IN PART WITH VOICE RECOGNITION SOFTWARE.  OCCASIONALLY THIS SOFTWARE WILL MISINTERPRET WORDS OR PHRASES.     Assessment and Plan:    Acute bacterial sinusitis  -     amoxicillin-clavulanate 875-125mg (AUGMENTIN) 875-125 mg per tablet; Take 1 tablet by mouth every 12 (twelve) hours. for 7 days  Dispense: 14 tablet; Refill: 0  -     methylPREDNISolone (MEDROL DOSEPACK) 4 mg tablet; use as directed  Dispense: 21 each; Refill: 0    Sinus congestion  -     methylPREDNISolone (MEDROL DOSEPACK) 4 mg tablet; use as directed  Dispense: 21 each; Refill: 0    Impacted cerumen of right ear  Comments:  Flushed to clear, tolerated well.   Recommend avoid using Q-tips  OTC Debrox to prevent buildup  Orders:  -     Ear wax removal             Visit summary:  Presenting signs and symptoms suggestive of bacterial sinusitis. Covered with Augmentin, Medrol Dosepak. We discussed symptomatic management with OTC meds and saline rinses. Will add Flonase and Mucinex. Discussed using saline rinse/mist prior to using Flonase to help with effectiveness. Will take Tylenol or Ibuprofen as needed for any pain and/or fever. Advised on signs/symptoms of emergent conditions. Patient advised to let us know if symptoms persist or if she develops any new or worsening symptoms.           Follow up if symptoms worsen or fail to improve.   ______________________________________________________________________  Subjective:    Chief Complaint:  Sinus congestion    HPI:  Paige is a 39 y.o. year old female here regarding persistent sinus congestion since January, thought it was initially due to seasonal allergies, she reports symptoms worsened over the past week with greenish thick nasal drainage and associated sinus pressure.   She reports minimal relief with Flonase.  She also reports right ear feels clogged with cerumen- she tried OTC Debrox kit and ear flush was no improvement.  She admits to using Q-tips, thinks this made it  "worse.     Medications:  Medications Ordered Prior to Encounter[1]    Review of Systems:  Review of Systems   Constitutional:  Negative for chills, fatigue and fever.   HENT:  Positive for congestion, hearing loss (muffled hearing right ear), postnasal drip, rhinorrhea and sinus pressure. Negative for ear discharge, ear pain, sore throat and tinnitus.    Respiratory:  Negative for cough and shortness of breath.    Cardiovascular:  Negative for chest pain.   Skin:  Negative for rash.   Neurological:  Positive for headaches.       Past Medical History:  Past Medical History:   Diagnosis Date    Migraine aura without headache     Migraine headache     TMJ arthritis        Objective:    Vitals:  Vitals:    03/17/25 1030   BP: 118/70   Temp: 98 °F (36.7 °C)   TempSrc: Oral   Weight: 71.9 kg (158 lb 8.2 oz)   Height: 5' 2" (1.575 m)   PainSc: 0-No pain       Physical Exam  Vitals and nursing note reviewed.   HENT:      Head: Normocephalic and atraumatic.      Right Ear: Tympanic membrane, ear canal and external ear normal. No tenderness. There is impacted cerumen. Tympanic membrane is not erythematous.      Left Ear: Ear canal and external ear normal. A middle ear effusion (clear fluid) is present.      Nose: Mucosal edema, congestion and rhinorrhea present. Rhinorrhea is purulent.      Right Turbinates: Swollen. Not pale.      Left Turbinates: Swollen. Not pale.      Right Sinus: No maxillary sinus tenderness or frontal sinus tenderness.      Left Sinus: No maxillary sinus tenderness or frontal sinus tenderness.      Mouth/Throat:      Mouth: Mucous membranes are moist.      Pharynx: Uvula midline. No pharyngeal swelling or posterior oropharyngeal erythema.   Eyes:      Conjunctiva/sclera: Conjunctivae normal.   Neck:      Thyroid: No thyromegaly.   Cardiovascular:      Rate and Rhythm: Normal rate and regular rhythm.      Heart sounds: Normal heart sounds.   Pulmonary:      Effort: Pulmonary effort is normal.      " Breath sounds: Normal breath sounds.   Musculoskeletal:         General: Normal range of motion.      Cervical back: Normal range of motion and neck supple.   Lymphadenopathy:      Cervical: No cervical adenopathy.   Skin:     General: Skin is warm and dry.   Neurological:      General: No focal deficit present.      Mental Status: She is alert and oriented to person, place, and time.      Cranial Nerves: No cranial nerve deficit.   Psychiatric:         Mood and Affect: Mood normal.         Behavior: Behavior normal.         Thought Content: Thought content normal.         Data:  .    Medical history reviewed, Medications reconciled.          LILI Stephen-C  Family Medicine           [1]   Current Outpatient Medications on File Prior to Visit   Medication Sig Dispense Refill    azelastine (ASTELIN) 137 mcg (0.1 %) nasal spray 1 spray (137 mcg total) by Nasal route 2 (two) times daily. 30 mL 0    fluticasone propionate (FLONASE) 50 mcg/actuation nasal spray 1 spray (50 mcg total) by Each Nostril route once daily. 48 mL 0    levonorgestrel (MIRENA) 20 mcg/24 hr (5 years) IUD 1 each by Intrauterine route once.      propranoloL (INDERAL) 10 MG tablet TAKE 1 TO 2 TABLETS(10 TO 20 MG) BY MOUTH THREE TIMES DAILY AS NEEDED FOR ANXIETY 180 tablet 0    venlafaxine (EFFEXOR XR) 75 MG 24 hr capsule Take 1 capsule (75 mg total) by mouth once daily. 90 capsule 3    cetirizine (ZYRTEC) 10 MG tablet Take 1 tablet (10 mg total) by mouth once daily. 30 tablet 0    eletriptan (RELPAX) 40 MG tablet Take 1 tablet (40 mg total) by mouth as needed. may repeat in 2 hours if necessary 30 tablet 1    pantoprazole (PROTONIX) 20 MG tablet Take 1 tablet (20 mg total) by mouth once daily. 90 tablet 1    triamcinolone acetonide 0.1% (KENALOG) 0.1 % cream AAA bid 60 g 0    [DISCONTINUED] predniSONE (DELTASONE) 10 MG tablet Take 4 tabs daily for 2 days, then 3 tabs daily for 2 days, then 2 tabs daily for 2 days, then 1 tab daily for 2 days,  then stop 20 tablet 0     Current Facility-Administered Medications on File Prior to Visit   Medication Dose Route Frequency Provider Last Rate Last Admin    triamcinolone acetonide injection 40 mg  40 mg Intramuscular 1 time in Clinic/HOD

## 2025-03-24 ENCOUNTER — OFFICE VISIT (OUTPATIENT)
Dept: OPTOMETRY | Facility: CLINIC | Age: 40
End: 2025-03-24
Payer: OTHER GOVERNMENT

## 2025-03-24 DIAGNOSIS — H52.203 MYOPIA OF BOTH EYES WITH ASTIGMATISM: Primary | ICD-10-CM

## 2025-03-24 DIAGNOSIS — Z98.890 HISTORY OF LASER REFRACTIVE SURGERY: ICD-10-CM

## 2025-03-24 DIAGNOSIS — H52.13 MYOPIA OF BOTH EYES WITH ASTIGMATISM: Primary | ICD-10-CM

## 2025-03-24 PROCEDURE — 99213 OFFICE O/P EST LOW 20 MIN: CPT | Mod: PBBFAC,PO | Performed by: OPTOMETRIST

## 2025-03-24 PROCEDURE — 99999 PR PBB SHADOW E&M-EST. PATIENT-LVL III: CPT | Mod: PBBFAC,,, | Performed by: OPTOMETRIST

## 2025-03-24 PROCEDURE — 92014 COMPRE OPH EXAM EST PT 1/>: CPT | Mod: S$PBB,,, | Performed by: OPTOMETRIST

## 2025-03-24 PROCEDURE — 92015 DETERMINE REFRACTIVE STATE: CPT | Mod: ,,, | Performed by: OPTOMETRIST

## 2025-03-24 NOTE — PROGRESS NOTES
HPI    Pt here for annual eye exam DLS- 05/11/23    Pt complains of blurry vision when watching T.V and a lot of glare when   driving at night.   Tried to wear last spec RX but they caused headaches.   Denies F/F and GTTS.   Last edited by Lila Vera on 3/24/2025  1:54 PM.            Assessment /Plan     For exam results, see Encounter Report.    Myopia of both eyes with astigmatism    History of laser refractive surgery      New Spectacle Rx given, discussed different options for glasses. RTC 1 year routine eye exam.  2. S/P lasik

## 2025-07-01 ENCOUNTER — RESULTS FOLLOW-UP (OUTPATIENT)
Dept: FAMILY MEDICINE | Facility: CLINIC | Age: 40
End: 2025-07-01

## 2025-07-01 ENCOUNTER — HOSPITAL ENCOUNTER (OUTPATIENT)
Dept: RADIOLOGY | Facility: HOSPITAL | Age: 40
Discharge: HOME | End: 2025-07-01
Attending: NURSE PRACTITIONER
Payer: OTHER GOVERNMENT

## 2025-07-01 ENCOUNTER — OFFICE VISIT (OUTPATIENT)
Dept: FAMILY MEDICINE | Facility: CLINIC | Age: 40
End: 2025-07-01
Payer: OTHER GOVERNMENT

## 2025-07-01 VITALS
WEIGHT: 155 LBS | OXYGEN SATURATION: 99 % | HEART RATE: 56 BPM | HEIGHT: 62 IN | DIASTOLIC BLOOD PRESSURE: 64 MMHG | SYSTOLIC BLOOD PRESSURE: 122 MMHG | BODY MASS INDEX: 28.52 KG/M2

## 2025-07-01 DIAGNOSIS — R09.81 SINUS CONGESTION: ICD-10-CM

## 2025-07-01 DIAGNOSIS — R93.0 ABNORMAL X-RAY OF PARANASAL SINUS: Primary | ICD-10-CM

## 2025-07-01 DIAGNOSIS — J32.0 CHRONIC MAXILLARY SINUSITIS: Primary | ICD-10-CM

## 2025-07-01 PROCEDURE — 99214 OFFICE O/P EST MOD 30 MIN: CPT | Mod: PBBFAC,PN | Performed by: NURSE PRACTITIONER

## 2025-07-01 PROCEDURE — 99999 PR PBB SHADOW E&M-EST. PATIENT-LVL IV: CPT | Mod: PBBFAC,,, | Performed by: NURSE PRACTITIONER

## 2025-07-01 PROCEDURE — 99214 OFFICE O/P EST MOD 30 MIN: CPT | Mod: S$PBB,,, | Performed by: NURSE PRACTITIONER

## 2025-07-01 PROCEDURE — 70220 X-RAY EXAM OF SINUSES: CPT | Mod: 26,,, | Performed by: RADIOLOGY

## 2025-07-01 PROCEDURE — 70220 X-RAY EXAM OF SINUSES: CPT | Mod: TC,FY,PO

## 2025-07-01 RX ORDER — METHYLPREDNISOLONE 4 MG/1
TABLET ORAL
Qty: 21 EACH | Refills: 0 | Status: SHIPPED | OUTPATIENT
Start: 2025-07-01 | End: 2025-07-22

## 2025-07-01 RX ORDER — DOXYCYCLINE 100 MG/1
100 CAPSULE ORAL 2 TIMES DAILY
Qty: 14 CAPSULE | Refills: 0 | Status: SHIPPED | OUTPATIENT
Start: 2025-07-01 | End: 2025-07-08

## 2025-07-01 NOTE — PROGRESS NOTES
THIS DOCUMENT WAS MADE IN PART WITH VOICE RECOGNITION SOFTWARE.  OCCASIONALLY THIS SOFTWARE WILL MISINTERPRET WORDS OR PHRASES.     Assessment and Plan:    Chronic maxillary sinusitis  -     doxycycline (VIBRAMYCIN) 100 MG Cap; Take 1 capsule (100 mg total) by mouth 2 (two) times daily. for 7 days  Dispense: 14 capsule; Refill: 0  -     methylPREDNISolone (MEDROL DOSEPACK) 4 mg tablet; use as directed  Dispense: 21 each; Refill: 0  -     Ambulatory referral/consult to ENT; Future; Expected date: 07/08/2025    Sinus congestion  -     X-Ray Sinuses Min 3 Views; Future; Expected date: 07/01/2025  -     methylPREDNISolone (MEDROL DOSEPACK) 4 mg tablet; use as directed  Dispense: 21 each; Refill: 0  -     Ambulatory referral/consult to ENT; Future; Expected date: 07/08/2025             Visit summary:    Assessed persistent sinus congestion since January, noting feeling of unilateral nasal obstruction on the right side.  Will obtain sinus xray-to evaluate for for mucosal thickening, sinus opacification  Consider further evaluation with CT if needed.  Recommend patient see ENT for further evaluation  Referral placed    Advised on symptomatic treatment and medications.   Covered with doxycycline, Medrol Dosepak to help with inflammation. We discussed symptomatic management with OTC meds and saline rinses. Will add Flonase and Mucinex. Discussed using saline rinse/mist prior to using Flonase to help with effectiveness.. Advised on signs/symptoms of emergent conditions. Patient advised to let us know if symptoms persist or if She develops any new or worsening symptoms.              ______________________________________________________________________  Subjective:    History of Present Illness    CHIEF COMPLAINT:  - Patient presents with persistent sinus congestion since January, with difficulty breathing through the right side of the nose.    HPI:  Patient reports sinus congestion since January, lasting several months. She  "was previously evaluated for a sinus infection and received steroids and antibiotics, but symptoms persisted. She notes decreased airflow on the right side of her nose compared to the left, a new symptom developed within the last few months.    She typically has seasonal allergies in spring, but emphasizes that current symptoms have been persistent since January. When blowing her nose, the mucus is still green and hardened, not loose, and difficult to clear completely.  This began over the last couple of weeks. She has noticed a change in the shape of the opening at the bottom of her nose on the right side, describing it as smaller. This is a recent observation. The inside of her nose feels very dry.    Regarding treatment, she admits to inconsistent use of nasal sprays. She completed a course of antibiotics and steroids previously, which provided temporary relief, but symptoms returned afterward. She typically has one sinus infection per year, but this current episode seems more persistent than usual.    She denies fever and sinus tenderness.          Medications:  Medications Ordered Prior to Encounter[1]    Review of Systems:  Review of Systems   Constitutional:  Negative for chills, fatigue and fever.   HENT:  Positive for congestion and sinus pressure. Negative for sinus pain and sore throat.    Respiratory:  Negative for cough.        Past Medical History:  Past Medical History:   Diagnosis Date    Migraine aura without headache     Migraine headache     TMJ arthritis        Objective:    Vitals:  Vitals:    07/01/25 0906   BP: 122/64   Pulse: (!) 56   SpO2: 99%   Weight: 70.3 kg (154 lb 15.7 oz)   Height: 5' 2" (1.575 m)   PainSc: 0-No pain       Physical Exam  Vitals and nursing note reviewed.   HENT:      Head: Normocephalic and atraumatic.      Right Ear: Tympanic membrane, ear canal and external ear normal.      Left Ear: Tympanic membrane, ear canal and external ear normal.      Nose: Mucosal edema and " congestion present. No rhinorrhea.      Right Turbinates: Swollen. Not pale.      Left Turbinates: Swollen. Not pale.      Right Sinus: No maxillary sinus tenderness or frontal sinus tenderness.      Left Sinus: No maxillary sinus tenderness or frontal sinus tenderness.      Mouth/Throat:      Mouth: Mucous membranes are moist.      Pharynx: Uvula midline. No pharyngeal swelling, posterior oropharyngeal erythema or postnasal drip.   Eyes:      Conjunctiva/sclera: Conjunctivae normal.   Neck:      Thyroid: No thyromegaly.   Cardiovascular:      Rate and Rhythm: Normal rate and regular rhythm.      Heart sounds: Normal heart sounds.   Pulmonary:      Effort: Pulmonary effort is normal.      Breath sounds: Normal breath sounds.   Musculoskeletal:         General: Normal range of motion.      Cervical back: Normal range of motion and neck supple.   Lymphadenopathy:      Cervical: No cervical adenopathy.   Skin:     General: Skin is warm and dry.   Neurological:      General: No focal deficit present.      Mental Status: She is alert and oriented to person, place, and time.      Cranial Nerves: No cranial nerve deficit.   Psychiatric:         Mood and Affect: Mood normal.         Behavior: Behavior normal.         Thought Content: Thought content normal.         Data:  CMP  Sodium   Date Value Ref Range Status   06/11/2024 141 136 - 145 mmol/L Final     Potassium   Date Value Ref Range Status   06/11/2024 4.1 3.5 - 5.1 mmol/L Final     Chloride   Date Value Ref Range Status   06/11/2024 106 95 - 110 mmol/L Final     CO2   Date Value Ref Range Status   06/11/2024 24 23 - 29 mmol/L Final     Glucose   Date Value Ref Range Status   06/11/2024 82 70 - 110 mg/dL Final     BUN   Date Value Ref Range Status   06/11/2024 15 6 - 20 mg/dL Final     Creatinine   Date Value Ref Range Status   06/11/2024 1.0 0.5 - 1.4 mg/dL Final     Calcium   Date Value Ref Range Status   06/11/2024 9.5 8.7 - 10.5 mg/dL Final     Total Protein    Date Value Ref Range Status   06/11/2024 6.6 6.0 - 8.4 g/dL Final     Albumin   Date Value Ref Range Status   06/11/2024 4.1 3.5 - 5.2 g/dL Final     Total Bilirubin   Date Value Ref Range Status   06/11/2024 0.4 0.1 - 1.0 mg/dL Final     Comment:     For infants and newborns, interpretation of results should be based  on gestational age, weight and in agreement with clinical  observations.    Premature Infant recommended reference ranges:  Up to 24 hours.............<8.0 mg/dL  Up to 48 hours............<12.0 mg/dL  3-5 days..................<15.0 mg/dL  6-29 days.................<15.0 mg/dL       Alkaline Phosphatase   Date Value Ref Range Status   06/11/2024 43 (L) 55 - 135 U/L Final     AST   Date Value Ref Range Status   06/11/2024 19 10 - 40 U/L Final     ALT   Date Value Ref Range Status   06/11/2024 12 10 - 44 U/L Final     Anion Gap   Date Value Ref Range Status   06/11/2024 11 8 - 16 mmol/L Final     eGFR   Date Value Ref Range Status   06/11/2024 >60.0 >60 mL/min/1.73 m^2 Final    .      Medical history reviewed, Medications reconciled.          LILI Stephen-C  Family Medicine           [1]   Current Outpatient Medications on File Prior to Visit   Medication Sig Dispense Refill    levonorgestrel (MIRENA) 20 mcg/24 hr (5 years) IUD 1 each by Intrauterine route once.      propranoloL (INDERAL) 10 MG tablet TAKE 1 TO 2 TABLETS(10 TO 20 MG) BY MOUTH THREE TIMES DAILY AS NEEDED FOR ANXIETY 180 tablet 0    venlafaxine (EFFEXOR XR) 75 MG 24 hr capsule Take 1 capsule (75 mg total) by mouth once daily. 90 capsule 3    eletriptan (RELPAX) 40 MG tablet Take 1 tablet (40 mg total) by mouth as needed. may repeat in 2 hours if necessary (Patient not taking: Reported on 7/1/2025) 30 tablet 1    fluticasone propionate (FLONASE) 50 mcg/actuation nasal spray 1 spray (50 mcg total) by Each Nostril route once daily. (Patient not taking: Reported on 7/1/2025) 48 mL 0    [DISCONTINUED] azelastine (ASTELIN) 137 mcg  (0.1 %) nasal spray 1 spray (137 mcg total) by Nasal route 2 (two) times daily. (Patient not taking: Reported on 7/1/2025) 30 mL 0    [DISCONTINUED] cetirizine (ZYRTEC) 10 MG tablet Take 1 tablet (10 mg total) by mouth once daily. (Patient not taking: Reported on 7/1/2025) 30 tablet 0    [DISCONTINUED] pantoprazole (PROTONIX) 20 MG tablet Take 1 tablet (20 mg total) by mouth once daily. (Patient not taking: Reported on 7/1/2025) 90 tablet 1    [DISCONTINUED] triamcinolone acetonide 0.1% (KENALOG) 0.1 % cream AAA bid (Patient not taking: Reported on 7/1/2025) 60 g 0     Current Facility-Administered Medications on File Prior to Visit   Medication Dose Route Frequency Provider Last Rate Last Admin    [DISCONTINUED] triamcinolone acetonide injection 40 mg  40 mg Intramuscular 1 time in Clinic/HOD

## 2025-07-07 ENCOUNTER — RESULTS FOLLOW-UP (OUTPATIENT)
Dept: FAMILY MEDICINE | Facility: CLINIC | Age: 40
End: 2025-07-07

## 2025-07-07 ENCOUNTER — HOSPITAL ENCOUNTER (OUTPATIENT)
Dept: RADIOLOGY | Facility: HOSPITAL | Age: 40
Discharge: HOME | End: 2025-07-07
Attending: NURSE PRACTITIONER
Payer: OTHER GOVERNMENT

## 2025-07-07 DIAGNOSIS — R93.0 ABNORMAL X-RAY OF PARANASAL SINUS: ICD-10-CM

## 2025-07-07 PROCEDURE — 70450 CT HEAD/BRAIN W/O DYE: CPT | Mod: TC,PO

## 2025-07-07 PROCEDURE — 70450 CT HEAD/BRAIN W/O DYE: CPT | Mod: 26,,, | Performed by: RADIOLOGY

## 2025-07-29 ENCOUNTER — OFFICE VISIT (OUTPATIENT)
Dept: OTOLARYNGOLOGY | Facility: CLINIC | Age: 40
End: 2025-07-29
Payer: OTHER GOVERNMENT

## 2025-07-29 VITALS — WEIGHT: 157.19 LBS | BODY MASS INDEX: 28.93 KG/M2 | HEIGHT: 62 IN

## 2025-07-29 DIAGNOSIS — J34.89 NASAL VALVE STENOSIS: ICD-10-CM

## 2025-07-29 DIAGNOSIS — R09.81 SINUS CONGESTION: ICD-10-CM

## 2025-07-29 DIAGNOSIS — J34.2 NASAL SEPTAL DEVIATION: Primary | ICD-10-CM

## 2025-07-29 DIAGNOSIS — J31.0 RHINITIS, UNSPECIFIED TYPE: ICD-10-CM

## 2025-07-29 DIAGNOSIS — J34.3 HYPERTROPHY OF BOTH INFERIOR NASAL TURBINATES: ICD-10-CM

## 2025-07-29 PROCEDURE — 99214 OFFICE O/P EST MOD 30 MIN: CPT | Mod: PBBFAC,PO | Performed by: OTOLARYNGOLOGY

## 2025-07-29 PROCEDURE — 99999 PR PBB SHADOW E&M-EST. PATIENT-LVL IV: CPT | Mod: PBBFAC,,, | Performed by: OTOLARYNGOLOGY

## 2025-07-29 PROCEDURE — 99204 OFFICE O/P NEW MOD 45 MIN: CPT | Mod: S$PBB,,, | Performed by: OTOLARYNGOLOGY

## 2025-07-29 RX ORDER — FLUTICASONE PROPIONATE 50 MCG
2 SPRAY, SUSPENSION (ML) NASAL DAILY
Qty: 16 G | Refills: 11 | Status: SHIPPED | OUTPATIENT
Start: 2025-07-29

## 2025-07-29 NOTE — PATIENT INSTRUCTIONS
"Nasal Saline  Nasal saline is an effective, natural way to keep the nose clean, moisturized, and open. It works very well in conjunction with other medications when needed.  This will help remove the allergens, debris, and mucous from your nose to help you breathe. It will also clear it in preparation for other nasal medications.    There are two types of saline use for the nose:    The first is nasal saline spray. This is a LOW volume, sometimes mist. You can purchase over the counter as "Ocean mist" or Saline Spray. This works well to moisturize the nose and help loosen small amount of debris. However, for more significant nasal issues, using a nasal saline lavage is more effective.   Nasal saline irrigations or lavage is a HIGH volume rinse. This involved using 8oz. of distilled water mixed with saline packet to rinse the entire nose and remove debris, pollen, dust, and infection. Many products exist for this, but I find the most effective for a good price is the "[facility] Sinus Rinse Kit."    You cannot overdose on saline washes, so it is safe to use 1-3 times per day as needed, long term.     To perform a saline irrigation, purchase an over the counter sinus irrigation kit such as the [employer] Sinus Rinse Kit. Use as directed on the box. You should use distilled water or water that was previously boiled and left to cool. If you wish, you may make your own solution. However, salt packets are available in the nasal section in your  drug store.     A rough estimate for making salt solution is:  8oz water  2 teaspoons salt (pickling, viral or Kosher salt)  1 teaspoon baking soda    After each use, rinse the bottle with small amount of rubbing alcohol and clean with soap.  Replace the irrigation bottle if it becomes visibly soiled or every few weeks.     Nasal Steroid Spray    You have been prescribed or instructed to take a nasal steroid spray. Examples of this medication include Flonase (fluticasone), Nasacort " (triamcinolone), and Rhinocort (budeosnide). Some symptoms will experience relief within 1-2 days; however, it may take other side effects 2-3 weeks to begin to see improvement. This medication needs to be taken consistently to see results.    Use as directed, spraying 1-2 times in each nostril per day.     Helpful hints for maximizing medication into the nose  - Use the opposite hand to spray the nostril (example: right hand for left nostril). This will help avoid spraying the medication onto the septum (the area that divides the left and right nasal cavity.)  - Tilt the bottle so that it is facing at a slight angle up or straight back, but avoid pointing the bottle straight up while spraying.   - Sniff in while you are spraying.    Side effects:  Overall, this is a well tolerated medication with low side effects. The benefit of nasal steroids as opposed to oral steroids is that the nasal steroid works primarily in the nose.  Common side effects: headache, nasal dryness, minor nose bleeds,   Rare side effects: septal perforation, elevated eye pressures, dry eyes, change in smell, allergic reaction.  Notify your doctor if you have any concerns or experience these symptoms.

## 2025-07-29 NOTE — PROGRESS NOTES
Subjective:       Patient ID: Paige Barros is a 40 y.o. female.    Chief Complaint: Sinus Problem      Paige is here for sinus/nasal complaints. Symptoms have been present for > 6 mos.   Quickly describes 1-2 sinus infections per year which improve with therapy within a reasonable timeframe.  Her most recent sinus infection began in January and took nearly 6 months to improve.  She was given 2 rounds of antibiotics, 1 towards the beginning and 1 round towards the end with a course of oral steroids.  Her symptoms included bilateral maxillary pressure, increased drainage which was discolored and thicker, and nasal congestion right greater than left.  She has not been using any regular nasal medications.  She does report a remote history of allergies with allergy testing, dust mites seasonal things.  She has some mild ocular symptoms but no sneezing or nasal itching.  Therapies tried: Flonase    Pertinent medical issues: migraine;             Tobacco Use History[1]  Social History     Substance and Sexual Activity   Alcohol Use Yes    Comment: Occasional, never heavy drinker        Objective:        Constitutional:   She is oriented to person, place, and time. She appears well-developed and well-nourished. She appears alert. She is active. Normal speech.      Head:  Normocephalic and atraumatic. Head is without TMJ tenderness. No scars. Salivary glands normal.  Facial strength is normal.      Ears:    Right Ear: No drainage or swelling. No middle ear effusion.   Left Ear: No drainage or swelling.  No middle ear effusion.     Nose:  Septal deviation present. No mucosal edema, rhinorrhea or sinus tenderness. No turbinate hypertrophy.    Mild narrowing of right middle 3rd  Only mild improvement with modified Nick +/- topical decongestion of turbinates    Mouth/Throat  Oropharynx clear and moist without lesions or asymmetry, normal uvula midline and mirror exam normal. Normal dentition. No uvula swelling,  lacerations or trismus. No oropharyngeal exudate. Tonsillar erythema, tonsillar exudate.      Neck:  Full range of motion with neck supple and no adenopathy. Thyroid tenderness is present. No tracheal deviation, no edema, no erythema, normal range of motion, no stridor, no crepitus and no neck rigidity present. No thyroid mass present.     Cardiovascular:    Intact distal pulses and normal pulses.              Pulmonary/Chest:   Effort normal and breath sounds normal. No stridor.     Psychiatric:   Her speech is normal and behavior is normal. Her mood appears not anxious. Her affect is not labile.     Neurological:   She is alert and oriented to person, place, and time. No sensory deficit.     Skin:   No abrasions, lacerations, lesions, or rashes. No abrasion and no bruising noted.         Tests / Results:  CT Head personally reviewed 7/2025:  Visualized paranasal sinuses (with exception of inferior half of maxillary) are normal, well aerated, no obstruction.     Assessment:       1. Nasal septal deviation    2. Rhinitis, unspecified type    3. Sinus congestion    4. Hypertrophy of both inferior nasal turbinates    5. Nasal valve stenosis          Plan:         We discussed general treatment of rhinitis.  At this point, my suspicion for sinus obstruction or chronic sinus infection is low.  Her CT scan which we reviewed together showed no significant changes.    We will trial nasal steroid and nasal saline regularly.  We can layer in nasal antihistamine plus or minus oral antihistamine and she can message me with an update if she has persistent symptoms.  At this time, she does have some structural issues which we briefly talked about but the maneuvers to improve did not seem to appreciably change her subjective congestion so we will monitor these for now           [1]   Social History  Tobacco Use   Smoking Status Former    Current packs/day: 0.00    Types: Cigarettes    Quit date: 8/1/2011    Years since quitting:  14.0    Passive exposure: Past   Smokeless Tobacco Never   Tobacco Comments    smoked ~18yo to 28 yo